# Patient Record
Sex: FEMALE | Race: OTHER | HISPANIC OR LATINO
[De-identification: names, ages, dates, MRNs, and addresses within clinical notes are randomized per-mention and may not be internally consistent; named-entity substitution may affect disease eponyms.]

---

## 2019-05-07 ENCOUNTER — ASOB RESULT (OUTPATIENT)
Age: 29
End: 2019-05-07

## 2019-05-07 ENCOUNTER — APPOINTMENT (OUTPATIENT)
Dept: ANTEPARTUM | Facility: CLINIC | Age: 29
End: 2019-05-07
Payer: COMMERCIAL

## 2019-05-07 PROCEDURE — 76801 OB US < 14 WKS SINGLE FETUS: CPT

## 2019-05-07 PROCEDURE — 36416 COLLJ CAPILLARY BLOOD SPEC: CPT

## 2019-05-07 PROCEDURE — 76813 OB US NUCHAL MEAS 1 GEST: CPT

## 2019-07-05 ENCOUNTER — ASOB RESULT (OUTPATIENT)
Age: 29
End: 2019-07-05

## 2019-07-05 ENCOUNTER — APPOINTMENT (OUTPATIENT)
Dept: ANTEPARTUM | Facility: CLINIC | Age: 29
End: 2019-07-05
Payer: COMMERCIAL

## 2019-07-05 PROCEDURE — 76811 OB US DETAILED SNGL FETUS: CPT

## 2019-09-14 ENCOUNTER — OUTPATIENT (OUTPATIENT)
Dept: INPATIENT UNIT | Facility: HOSPITAL | Age: 29
LOS: 1 days | Discharge: HOME | End: 2019-09-14
Payer: COMMERCIAL

## 2019-09-14 VITALS — DIASTOLIC BLOOD PRESSURE: 95 MMHG | SYSTOLIC BLOOD PRESSURE: 147 MMHG | HEART RATE: 86 BPM

## 2019-09-14 VITALS
DIASTOLIC BLOOD PRESSURE: 95 MMHG | SYSTOLIC BLOOD PRESSURE: 141 MMHG | TEMPERATURE: 99 F | HEART RATE: 80 BPM | RESPIRATION RATE: 18 BRPM

## 2019-09-14 DIAGNOSIS — O26.899 OTHER SPECIFIED PREGNANCY RELATED CONDITIONS, UNSPECIFIED TRIMESTER: ICD-10-CM

## 2019-09-14 DIAGNOSIS — Z3A.00 WEEKS OF GESTATION OF PREGNANCY NOT SPECIFIED: ICD-10-CM

## 2019-09-14 LAB
ALBUMIN SERPL ELPH-MCNC: 3.6 G/DL — SIGNIFICANT CHANGE UP (ref 3.3–5)
ALP SERPL-CCNC: 164 U/L — HIGH (ref 40–120)
ALT FLD-CCNC: 18 U/L — SIGNIFICANT CHANGE UP (ref 4–33)
ANION GAP SERPL CALC-SCNC: 13 MMO/L — SIGNIFICANT CHANGE UP (ref 7–14)
APPEARANCE UR: SIGNIFICANT CHANGE UP
APTT BLD: 25.5 SEC — LOW (ref 27.5–36.3)
AST SERPL-CCNC: 20 U/L — SIGNIFICANT CHANGE UP (ref 4–32)
BACTERIA # UR AUTO: SIGNIFICANT CHANGE UP
BASOPHILS # BLD AUTO: 0.04 K/UL — SIGNIFICANT CHANGE UP (ref 0–0.2)
BASOPHILS NFR BLD AUTO: 0.4 % — SIGNIFICANT CHANGE UP (ref 0–2)
BILIRUB SERPL-MCNC: 0.3 MG/DL — SIGNIFICANT CHANGE UP (ref 0.2–1.2)
BILIRUB UR-MCNC: NEGATIVE — SIGNIFICANT CHANGE UP
BLOOD UR QL VISUAL: NEGATIVE — SIGNIFICANT CHANGE UP
BUN SERPL-MCNC: 5 MG/DL — LOW (ref 7–23)
CALCIUM SERPL-MCNC: 9.3 MG/DL — SIGNIFICANT CHANGE UP (ref 8.4–10.5)
CHLORIDE SERPL-SCNC: 104 MMOL/L — SIGNIFICANT CHANGE UP (ref 98–107)
CO2 SERPL-SCNC: 21 MMOL/L — LOW (ref 22–31)
COLOR SPEC: YELLOW — SIGNIFICANT CHANGE UP
CREAT ?TM UR-MCNC: 99.4 MG/DL — SIGNIFICANT CHANGE UP
CREAT SERPL-MCNC: 0.46 MG/DL — LOW (ref 0.5–1.3)
EOSINOPHIL # BLD AUTO: 0.15 K/UL — SIGNIFICANT CHANGE UP (ref 0–0.5)
EOSINOPHIL NFR BLD AUTO: 1.4 % — SIGNIFICANT CHANGE UP (ref 0–6)
FIBRINOGEN PPP-MCNC: 835.8 MG/DL — HIGH (ref 350–510)
GLUCOSE SERPL-MCNC: 123 MG/DL — HIGH (ref 70–99)
GLUCOSE UR-MCNC: NEGATIVE — SIGNIFICANT CHANGE UP
HCT VFR BLD CALC: 29 % — LOW (ref 34.5–45)
HGB BLD-MCNC: 9.2 G/DL — LOW (ref 11.5–15.5)
HYALINE CASTS # UR AUTO: NEGATIVE — SIGNIFICANT CHANGE UP
IMM GRANULOCYTES NFR BLD AUTO: 0.8 % — SIGNIFICANT CHANGE UP (ref 0–1.5)
INR BLD: 1.02 — SIGNIFICANT CHANGE UP (ref 0.88–1.17)
KETONES UR-MCNC: NEGATIVE — SIGNIFICANT CHANGE UP
LDH SERPL L TO P-CCNC: 189 U/L — SIGNIFICANT CHANGE UP (ref 135–225)
LEUKOCYTE ESTERASE UR-ACNC: SIGNIFICANT CHANGE UP
LYMPHOCYTES # BLD AUTO: 1.14 K/UL — SIGNIFICANT CHANGE UP (ref 1–3.3)
LYMPHOCYTES # BLD AUTO: 10.9 % — LOW (ref 13–44)
MCHC RBC-ENTMCNC: 25.9 PG — LOW (ref 27–34)
MCHC RBC-ENTMCNC: 31.7 % — LOW (ref 32–36)
MCV RBC AUTO: 81.7 FL — SIGNIFICANT CHANGE UP (ref 80–100)
MONOCYTES # BLD AUTO: 0.5 K/UL — SIGNIFICANT CHANGE UP (ref 0–0.9)
MONOCYTES NFR BLD AUTO: 4.8 % — SIGNIFICANT CHANGE UP (ref 2–14)
NEUTROPHILS # BLD AUTO: 8.59 K/UL — HIGH (ref 1.8–7.4)
NEUTROPHILS NFR BLD AUTO: 81.7 % — HIGH (ref 43–77)
NITRITE UR-MCNC: NEGATIVE — SIGNIFICANT CHANGE UP
NRBC # FLD: 0 K/UL — SIGNIFICANT CHANGE UP (ref 0–0)
PH UR: 7.5 — SIGNIFICANT CHANGE UP (ref 5–8)
PLATELET # BLD AUTO: 389 K/UL — SIGNIFICANT CHANGE UP (ref 150–400)
PMV BLD: 9.1 FL — SIGNIFICANT CHANGE UP (ref 7–13)
POTASSIUM SERPL-MCNC: 3.2 MMOL/L — LOW (ref 3.5–5.3)
POTASSIUM SERPL-SCNC: 3.2 MMOL/L — LOW (ref 3.5–5.3)
PROT SERPL-MCNC: 6.9 G/DL — SIGNIFICANT CHANGE UP (ref 6–8.3)
PROT UR-MCNC: 10 — SIGNIFICANT CHANGE UP
PROT UR-MCNC: 23.5 MG/DL — SIGNIFICANT CHANGE UP
PROTHROM AB SERPL-ACNC: 11.3 SEC — SIGNIFICANT CHANGE UP (ref 9.8–13.1)
RBC # BLD: 3.55 M/UL — LOW (ref 3.8–5.2)
RBC # FLD: 14.4 % — SIGNIFICANT CHANGE UP (ref 10.3–14.5)
RBC CASTS # UR COMP ASSIST: SIGNIFICANT CHANGE UP (ref 0–?)
SODIUM SERPL-SCNC: 138 MMOL/L — SIGNIFICANT CHANGE UP (ref 135–145)
SP GR SPEC: 1.01 — SIGNIFICANT CHANGE UP (ref 1–1.04)
SQUAMOUS # UR AUTO: SIGNIFICANT CHANGE UP
URATE SERPL-MCNC: 3.6 MG/DL — SIGNIFICANT CHANGE UP (ref 2.5–7)
UROBILINOGEN FLD QL: SIGNIFICANT CHANGE UP
WBC # BLD: 10.5 K/UL — SIGNIFICANT CHANGE UP (ref 3.8–10.5)
WBC # FLD AUTO: 10.5 K/UL — SIGNIFICANT CHANGE UP (ref 3.8–10.5)
WBC UR QL: HIGH (ref 0–?)
YEAST BUDDING # UR COMP ASSIST: SIGNIFICANT CHANGE UP

## 2019-09-14 PROCEDURE — 59025 FETAL NON-STRESS TEST: CPT | Mod: 26

## 2019-09-14 RX ORDER — SODIUM CHLORIDE 9 MG/ML
3 INJECTION INTRAMUSCULAR; INTRAVENOUS; SUBCUTANEOUS EVERY 8 HOURS
Refills: 0 | Status: DISCONTINUED | OUTPATIENT
Start: 2019-09-14 | End: 2019-10-05

## 2019-09-14 RX ORDER — LABETALOL HCL 100 MG
1 TABLET ORAL
Qty: 60 | Refills: 0
Start: 2019-09-14 | End: 2019-10-13

## 2019-09-14 NOTE — OB PROVIDER TRIAGE NOTE - NSHPPHYSICALEXAM_GEN_ALL_CORE
abdomen soft, nontender  taus: sliup, breech presentation, posterior placenta, bpp 8/8, justin 13.9, efw 1703 grams  nst in progress abdomen soft, nontender  taus: sliup, tahira breech presentation, posterior placenta, bpp 8/8, justin 13.9, efw 1703 grams  nst in progress

## 2019-09-14 NOTE — OB PROVIDER TRIAGE NOTE - NSOBPROVIDERNOTE_OBGYN_ALL_OB_FT
28 y.o.  BECCA 19 @ 30.3 weeks presents from scheduled prenatal appointment for evaluation of elevated BP in office of 150/98, and 160/98. Pt denies epigastric pain, RUQ pain, visual disturbance, HA. Pt states +FM and uncomplicated antepartum course.     allergies:  NKDA  medications:  prenatal vitamins  progesta    med/surg hx:  denies  OBGYN hx:  sab with d+c x 1  fibroid uterus x 2: 5cm and 6cm    abdomen soft, nontender  taus: sliup, breech presentation, posterior placenta, bpp 8/8, justin 13.9, efw 1703 grams  nst in progress    HELLP labs pending    discussed with Dr Horton, Dr Scott and Dr Caceres 28 y.o.  BECCA 19 @ 30.3 weeks presents from scheduled prenatal appointment for evaluation of elevated BP in office of 150/98, and 160/98. Pt denies epigastric pain, RUQ pain, visual disturbance, HA. Pt states +FM and uncomplicated antepartum course.     allergies:  NKDA  medications:  prenatal vitamins  progesta    med/surg hx:  denies  OBGYN hx:  sab with d+c x 1  fibroid uterus x 2: 5cm and 6cm    abdomen soft, nontender  taus: sliup, breech presentation, posterior placenta, bpp 8/8, justin 13.9, efw 1703 grams  nst in progress    HELLP labs pending    discussed with Dr Horton, Dr Scott and Dr Caceres    1339    HELLP labs reviewed:    10.5 > 9.2/29 <389    fibrinogen    serum creatinine 0.4  ast/alt   uric acid 3.6      UA  protein 10  negative nitrites  large leukocyte esterase  0-2 rbc  11-25 wbc    ucx pending    pcr 0.2    BPs    139/83, 138/89, 137/87, 137/93, 137/90 28 y.o.  BECCA 19 @ 30.3 weeks presents from scheduled prenatal appointment for evaluation of elevated BP in office of 150/98, and 160/98. Pt denies epigastric pain, RUQ pain, visual disturbance, HA. Pt states +FM. Antepartum course complicated by gHTN. Pt completed 24 hour urine collection 19, results unknown.    allergies:  NKDA  medications:  prenatal vitamins  progesta    med/surg hx:  denies  OBGYN hx:  sab with d+c x 1  fibroid uterus x 2: 5cm and 6cm    abdomen soft, nontender  taus: sliup, breech presentation, posterior placenta, bpp 8/8, justin 13.9, efw 1703 grams  nst in progress    HELLP labs pending    discussed with Dr Horton, Dr Scott and Dr Caceres    1339    HELLP labs reviewed:    10.5 > 9.2/29 <389    fibrinogen 835    serum creatinine 0.4  ast/alt   uric acid 3.6      UA  protein 10  negative nitrites  large leukocyte esterase  0-2 rbc  11-25 wbc    ucx pending    pcr 0.2    BPs    139/83, 138/89, 137/87, 137/93, 137/90    no evidence of pre-eclampsia at this time     labor precautions/fetal kick counts, signs and symptoms of pre-eclampsia reviewed. Encouraged increased PO hydration. F/U next scheduled prenatal appointment. 28 y.o.  BECCA 19 @ 30.3 weeks presents from scheduled prenatal appointment for evaluation of elevated BP in office of 150/98, and 160/98. Pt denies epigastric pain, RUQ pain, visual disturbance, HA. Pt states +FM. Antepartum course complicated by gHTN. Pt completed 24 hour urine collection 19, results unknown.    allergies:  NKDA  medications:  prenatal vitamins  progesta    med/surg hx:  denies  OBGYN hx:  sab with d+c x 1  fibroid uterus x 2: 5cm and 6cm    abdomen soft, nontender  taus: sliup, breech presentation, posterior placenta, posterior fibroid noted 5x6 cm, bpp 8/8, justin 13.9, efw 1703 grams  nst in progress    HELLP labs pending    discussed with Dr Horton, Dr Scott and Dr Caceres    1339    HELLP labs reviewed:    10.5 > 9.2/29 <389    fibrinogen 835    serum creatinine 0.4  ast/alt   uric acid 3.6      UA  protein 10  negative nitrites  large leukocyte esterase  0-2 rbc  11-25 wbc    ucx pending    pcr 0.2    BPs    139/83, 138/89, 137/87, 137/93, 137/90    no evidence of pre-eclampsia at this time    discussed with Dr Agustin. Pt for discharge home with rx for labetalol 100 mg PO BID and prenatal appointment on 19.     labor precautions/fetal kick counts, signs and symptoms of pre-eclampsia reviewed. Encouraged increased PO hydration.     Clara, GRIFFIN

## 2019-09-14 NOTE — OB RN TRIAGE NOTE - CHIEF COMPLAINT QUOTE
"My dr told me to come to the hospital because I had high blood pressure in the office."  150/98, 160/98.  24 urine protein collection done and WNL.

## 2019-09-14 NOTE — OB RN TRIAGE NOTE - CURRENT PREGNANCY COMPLICATIONS, OB PROFILE
failed 1st glucose screen and was unable to tolerate 3 gr screen.  pt told to monitor FS./Hypertensive Disorder

## 2019-09-14 NOTE — OB PROVIDER TRIAGE NOTE - ADDITIONAL INSTRUCTIONS
Please take your medication as prescribed. Please make an appointment to be seen in your OB office for Monday 9/16/19. Please drink 1-2 liters of fluid per day.

## 2019-09-14 NOTE — OB PROVIDER TRIAGE NOTE - HISTORY OF PRESENT ILLNESS
28 y.o.  BECCA 19 @ 30.3 weeks presents from scheduled prenatal appointment for evaluation of elevated BP in office of 150/98, and 160/98. Pt denies epigastric pain, RUQ pain, visual disturbance, HA. Pt states +FM and uncomplicated antepartum course.

## 2019-09-15 LAB
BACTERIA UR CULT: SIGNIFICANT CHANGE UP
SPECIMEN SOURCE: SIGNIFICANT CHANGE UP

## 2019-09-23 ENCOUNTER — OUTPATIENT (OUTPATIENT)
Dept: OUTPATIENT SERVICES | Facility: HOSPITAL | Age: 29
LOS: 1 days | End: 2019-09-23

## 2019-09-23 ENCOUNTER — APPOINTMENT (OUTPATIENT)
Dept: ANTEPARTUM | Facility: CLINIC | Age: 29
End: 2019-09-23

## 2019-09-23 ENCOUNTER — APPOINTMENT (OUTPATIENT)
Dept: ANTEPARTUM | Facility: CLINIC | Age: 29
End: 2019-09-23
Payer: COMMERCIAL

## 2019-09-23 ENCOUNTER — ASOB RESULT (OUTPATIENT)
Age: 29
End: 2019-09-23

## 2019-09-23 PROCEDURE — 76805 OB US >/= 14 WKS SNGL FETUS: CPT

## 2019-09-23 PROCEDURE — 99244 OFF/OP CNSLTJ NEW/EST MOD 40: CPT | Mod: 25

## 2019-09-23 PROCEDURE — 76818 FETAL BIOPHYS PROFILE W/NST: CPT | Mod: 26

## 2019-09-23 PROCEDURE — 76820 UMBILICAL ARTERY ECHO: CPT

## 2019-09-24 PROBLEM — O02.1 MISSED ABORTION: Chronic | Status: ACTIVE | Noted: 2019-09-14

## 2019-09-24 PROBLEM — D21.9 BENIGN NEOPLASM OF CONNECTIVE AND OTHER SOFT TISSUE, UNSPECIFIED: Chronic | Status: ACTIVE | Noted: 2019-09-14

## 2019-09-25 DIAGNOSIS — O34.13 MATERNAL CARE FOR BENIGN TUMOR OF CORPUS UTERI, THIRD TRIMESTER: ICD-10-CM

## 2019-09-25 DIAGNOSIS — O41.93X0 DISORDER OF AMNIOTIC FLUID AND MEMBRANES, UNSPECIFIED, THIRD TRIMESTER, NOT APPLICABLE OR UNSPECIFIED: ICD-10-CM

## 2019-09-25 DIAGNOSIS — Z3A.31 31 WEEKS GESTATION OF PREGNANCY: ICD-10-CM

## 2019-09-25 DIAGNOSIS — O34.90 MATERNAL CARE FOR ABNORMALITY OF PELVIC ORGAN, UNSPECIFIED, UNSPECIFIED TRIMESTER: ICD-10-CM

## 2019-09-25 DIAGNOSIS — O99.013 ANEMIA COMPLICATING PREGNANCY, THIRD TRIMESTER: ICD-10-CM

## 2019-09-25 DIAGNOSIS — O13.3 GESTATIONAL [PREGNANCY-INDUCED] HYPERTENSION WITHOUT SIGNIFICANT PROTEINURIA, THIRD TRIMESTER: ICD-10-CM

## 2019-09-30 ENCOUNTER — APPOINTMENT (OUTPATIENT)
Dept: ANTEPARTUM | Facility: CLINIC | Age: 29
End: 2019-09-30
Payer: COMMERCIAL

## 2019-09-30 ENCOUNTER — OUTPATIENT (OUTPATIENT)
Dept: OUTPATIENT SERVICES | Facility: HOSPITAL | Age: 29
LOS: 1 days | End: 2019-09-30

## 2019-09-30 ENCOUNTER — INPATIENT (INPATIENT)
Facility: HOSPITAL | Age: 29
LOS: 12 days | Discharge: HOME | End: 2019-10-13
Attending: OBSTETRICS & GYNECOLOGY | Admitting: OBSTETRICS & GYNECOLOGY
Payer: COMMERCIAL

## 2019-09-30 ENCOUNTER — ASOB RESULT (OUTPATIENT)
Age: 29
End: 2019-09-30

## 2019-09-30 VITALS — TEMPERATURE: 99 F

## 2019-09-30 DIAGNOSIS — O14.90 UNSPECIFIED PRE-ECLAMPSIA, UNSPECIFIED TRIMESTER: ICD-10-CM

## 2019-09-30 DIAGNOSIS — O14.13 SEVERE PRE-ECLAMPSIA, THIRD TRIMESTER: ICD-10-CM

## 2019-09-30 LAB
ALBUMIN SERPL ELPH-MCNC: 3.9 G/DL — SIGNIFICANT CHANGE UP (ref 3.3–5)
ALP SERPL-CCNC: 207 U/L — HIGH (ref 40–120)
ALT FLD-CCNC: 20 U/L — SIGNIFICANT CHANGE UP (ref 4–33)
ANION GAP SERPL CALC-SCNC: 12 MMO/L — SIGNIFICANT CHANGE UP (ref 7–14)
APPEARANCE UR: SIGNIFICANT CHANGE UP
APTT BLD: 27.1 SEC — LOW (ref 27.5–36.3)
AST SERPL-CCNC: 18 U/L — SIGNIFICANT CHANGE UP (ref 4–32)
BASOPHILS # BLD AUTO: 0.07 K/UL — SIGNIFICANT CHANGE UP (ref 0–0.2)
BASOPHILS NFR BLD AUTO: 0.6 % — SIGNIFICANT CHANGE UP (ref 0–2)
BILIRUB SERPL-MCNC: 0.3 MG/DL — SIGNIFICANT CHANGE UP (ref 0.2–1.2)
BILIRUB UR-MCNC: NEGATIVE — SIGNIFICANT CHANGE UP
BLD GP AB SCN SERPL QL: NEGATIVE — SIGNIFICANT CHANGE UP
BLOOD UR QL VISUAL: NEGATIVE — SIGNIFICANT CHANGE UP
BUN SERPL-MCNC: 6 MG/DL — LOW (ref 7–23)
CALCIUM SERPL-MCNC: 9.7 MG/DL — SIGNIFICANT CHANGE UP (ref 8.4–10.5)
CHLORIDE SERPL-SCNC: 102 MMOL/L — SIGNIFICANT CHANGE UP (ref 98–107)
CO2 SERPL-SCNC: 24 MMOL/L — SIGNIFICANT CHANGE UP (ref 22–31)
COLOR SPEC: SIGNIFICANT CHANGE UP
CREAT ?TM UR-MCNC: 61.7 MG/DL — SIGNIFICANT CHANGE UP
CREAT SERPL-MCNC: 0.55 MG/DL — SIGNIFICANT CHANGE UP (ref 0.5–1.3)
EOSINOPHIL # BLD AUTO: 0.18 K/UL — SIGNIFICANT CHANGE UP (ref 0–0.5)
EOSINOPHIL NFR BLD AUTO: 1.5 % — SIGNIFICANT CHANGE UP (ref 0–6)
FIBRINOGEN PPP-MCNC: 986 MG/DL — HIGH (ref 350–510)
GLUCOSE SERPL-MCNC: 79 MG/DL — SIGNIFICANT CHANGE UP (ref 70–99)
GLUCOSE UR-MCNC: NEGATIVE — SIGNIFICANT CHANGE UP
HCT VFR BLD CALC: 31.9 % — LOW (ref 34.5–45)
HGB BLD-MCNC: 9.9 G/DL — LOW (ref 11.5–15.5)
IMM GRANULOCYTES NFR BLD AUTO: 1.1 % — SIGNIFICANT CHANGE UP (ref 0–1.5)
KETONES UR-MCNC: NEGATIVE — SIGNIFICANT CHANGE UP
LEUKOCYTE ESTERASE UR-ACNC: SIGNIFICANT CHANGE UP
LYMPHOCYTES # BLD AUTO: 1.26 K/UL — SIGNIFICANT CHANGE UP (ref 1–3.3)
LYMPHOCYTES # BLD AUTO: 10.3 % — LOW (ref 13–44)
M PROTEIN 24H MFR UR ELPH: 19 MG/24 HR — SIGNIFICANT CHANGE UP
MAGNESIUM SERPL-MCNC: 5.1 MG/DL — HIGH (ref 1.6–2.6)
MCHC RBC-ENTMCNC: 25.8 PG — LOW (ref 27–34)
MCHC RBC-ENTMCNC: 31 % — LOW (ref 32–36)
MCV RBC AUTO: 83.1 FL — SIGNIFICANT CHANGE UP (ref 80–100)
MONOCYTES # BLD AUTO: 0.65 K/UL — SIGNIFICANT CHANGE UP (ref 0–0.9)
MONOCYTES NFR BLD AUTO: 5.3 % — SIGNIFICANT CHANGE UP (ref 2–14)
NEUTROPHILS # BLD AUTO: 9.93 K/UL — HIGH (ref 1.8–7.4)
NEUTROPHILS NFR BLD AUTO: 81.2 % — HIGH (ref 43–77)
NITRITE UR-MCNC: NEGATIVE — SIGNIFICANT CHANGE UP
NRBC # FLD: 0 K/UL — SIGNIFICANT CHANGE UP (ref 0–0)
PH UR: 7 — SIGNIFICANT CHANGE UP (ref 5–8)
PLATELET # BLD AUTO: 355 K/UL — SIGNIFICANT CHANGE UP (ref 150–400)
PMV BLD: 9.6 FL — SIGNIFICANT CHANGE UP (ref 7–13)
POTASSIUM SERPL-MCNC: 3.7 MMOL/L — SIGNIFICANT CHANGE UP (ref 3.5–5.3)
POTASSIUM SERPL-SCNC: 3.7 MMOL/L — SIGNIFICANT CHANGE UP (ref 3.5–5.3)
PROT SERPL-MCNC: 7.6 G/DL — SIGNIFICANT CHANGE UP (ref 6–8.3)
PROT UR-MCNC: 19 MG/DL — SIGNIFICANT CHANGE UP
PROT UR-MCNC: NEGATIVE — SIGNIFICANT CHANGE UP
RBC # BLD: 3.84 M/UL — SIGNIFICANT CHANGE UP (ref 3.8–5.2)
RBC # FLD: 16.2 % — HIGH (ref 10.3–14.5)
RBC CASTS # UR COMP ASSIST: SIGNIFICANT CHANGE UP (ref 0–?)
RH IG SCN BLD-IMP: POSITIVE — SIGNIFICANT CHANGE UP
RH IG SCN BLD-IMP: POSITIVE — SIGNIFICANT CHANGE UP
SODIUM SERPL-SCNC: 138 MMOL/L — SIGNIFICANT CHANGE UP (ref 135–145)
SP GR SPEC: 1.01 — SIGNIFICANT CHANGE UP (ref 1–1.04)
SQUAMOUS # UR AUTO: SIGNIFICANT CHANGE UP
T PALLIDUM AB TITR SER: NEGATIVE — SIGNIFICANT CHANGE UP
URATE SERPL-MCNC: 3.8 MG/DL — SIGNIFICANT CHANGE UP (ref 2.5–7)
UROBILINOGEN FLD QL: NORMAL — SIGNIFICANT CHANGE UP
WBC # BLD: 12.23 K/UL — HIGH (ref 3.8–10.5)
WBC # FLD AUTO: 12.23 K/UL — HIGH (ref 3.8–10.5)
WBC UR QL: HIGH (ref 0–?)

## 2019-09-30 PROCEDURE — 76820 UMBILICAL ARTERY ECHO: CPT

## 2019-09-30 PROCEDURE — 76818 FETAL BIOPHYS PROFILE W/NST: CPT | Mod: 26

## 2019-09-30 RX ORDER — MAGNESIUM SULFATE 500 MG/ML
2 VIAL (ML) INJECTION
Qty: 40 | Refills: 0 | Status: DISCONTINUED | OUTPATIENT
Start: 2019-09-30 | End: 2019-10-01

## 2019-09-30 RX ORDER — MAGNESIUM SULFATE 500 MG/ML
4 VIAL (ML) INJECTION ONCE
Refills: 0 | Status: DISCONTINUED | OUTPATIENT
Start: 2019-09-30 | End: 2019-09-30

## 2019-09-30 RX ORDER — LABETALOL HCL 100 MG
200 TABLET ORAL
Refills: 0 | Status: DISCONTINUED | OUTPATIENT
Start: 2019-09-30 | End: 2019-09-30

## 2019-09-30 RX ORDER — LABETALOL HCL 100 MG
20 TABLET ORAL ONCE
Refills: 0 | Status: COMPLETED | OUTPATIENT
Start: 2019-09-30 | End: 2019-09-30

## 2019-09-30 RX ORDER — MAGNESIUM SULFATE 500 MG/ML
4 VIAL (ML) INJECTION ONCE
Refills: 0 | Status: COMPLETED | OUTPATIENT
Start: 2019-09-30 | End: 2019-09-30

## 2019-09-30 RX ADMIN — Medication 12 MILLIGRAM(S): at 14:37

## 2019-09-30 RX ADMIN — Medication 50 GM/HR: at 14:43

## 2019-09-30 RX ADMIN — Medication 20 MILLIGRAM(S): at 15:41

## 2019-09-30 RX ADMIN — Medication 300 GRAM(S): at 14:28

## 2019-09-30 RX ADMIN — Medication 50 GM/HR: at 20:38

## 2019-09-30 NOTE — OB PROVIDER H&P - TERM DELIVERIES, OB PROFILE
0 Pt encountered in semisupine position, no distress, AxOx4, with +IV, left LE wrapped in ace bandage dry/intact.

## 2019-09-30 NOTE — H&P ADULT - HISTORY OF PRESENT ILLNESS
28 y.o.  BECCA 19 @ 32.5 weeks presents from ATU with elevated /100s on Labetalol. She has been seen in triage before for non-peristent BP in office of 150/98, and 160/98. She also complains of visual disturbance including lights in her vision for 1 week. Pt denies epigastric pain, RUQ pain, HA. Pt states +FM. Pt completed 24 hour urine collection 19, >300 protein/day    Allergies:  NKDA  Medications:  prenatal vitamins  progesta    PMSHx: D&C, anemia  OBGYN hx: SAB with d+c x 1  fibroid uterus x 2: 5cm and 6cm

## 2019-09-30 NOTE — H&P ADULT - PROBLEM SELECTOR PLAN 1
-admit to antepartum  -reg diet  -BMZ  -Mag  -Labetalol 200 BID, add Procardia if pressure continues to be elevated  -GBS culture  -PI labs  -P/C ratio  -plan for delivery at 34 weeks    D/w Dr. Kuo and Dr. Su Ponce PGY-2

## 2019-09-30 NOTE — H&P ADULT - ASSESSMENT
29 yo  at 32,5 here for elevated BP, now with sPEC by proteinuria, visual disturbances, and elevated BP while on medication. Will admit to antepartum service for BP monitoring, magnesium, BMZ, and BP treatment. NST today in ATU wnl

## 2019-09-30 NOTE — CHART NOTE - NSCHARTNOTEFT_GEN_A_CORE
PACU PA 1539     Called by RN due severe range BP x 2- 170/90'2. 169/ 100  Pt sent from ATU w/ blurred vision, intermittently & RUQ pain, on po Labetolol 100mg  @ home .  Pt states blurred vision resolved since admission.  Discussed w/ MD Gil PGY3  Labetolol 20mg IVP x1 stat  Procardia 30mg po daily  Anitra Shirley PAC

## 2019-09-30 NOTE — H&P ADULT - NSICDXPASTMEDICALHX_GEN_ALL_CORE_FT
PAST MEDICAL HISTORY:  Fibroids     Kidney stone     Missed  with d&c    No pertinent past medical history

## 2019-09-30 NOTE — H&P ADULT - NSHPPHYSICALEXAM_GEN_ALL_CORE
Physical Exam:   General: sitting comfortably in bed, NAD   CV: RR S1S2  Lungs: CTA b/l, good air flow b/l   Back: No CVA tenderness  Abd: gravid, no RUQ tenderness, NT, ND, normal bowel sounds  Vaginal: no vaginal bleeding noted on pad   Ext: NT b/l, no edema

## 2019-09-30 NOTE — OB PROVIDER H&P - ASSESSMENT
29 yo  at 32,5 here for elevated BP, now with sPEC by proteinuria, visual disturbances, and elevated BP while on medication. Will admit to antepartum service for BP monitoring, magnesium, BMZ, and BP treatment. NST today in ATU wnl.

## 2019-09-30 NOTE — OB PROVIDER H&P - NSHPPHYSICALEXAM_GEN_ALL_CORE
Physical Exam: Physical Exam:   	General: sitting comfortably in bed, NAD   	CV: RR S1S2  	Lungs: CTA b/l, good air flow b/l   	Back: No CVA tenderness  	Abd: gravid, no RUQ tenderness, NT, ND, normal bowel sounds  	Vaginal: no vaginal bleeding noted on pad   Ext: NT b/l, no edema

## 2019-09-30 NOTE — OB PROVIDER H&P - PROBLEM SELECTOR PLAN 1
Plan: -admit to antepartum  -reg diet  -BMZ  -Mag  -Labetalol 200 BID, add Procardia if pressure continues to be elevated  -GBS culture  -PIH labs  -P/C ratio  -plan for delivery at 34 weeks    D/w Dr. Kuo and Dr. Su Ponce PGY-2. Plan: -admit to antepartum  -reg diet  -BMZ for fetal lung maturity  -Magnesium for seizure prophylaxis  -Labetalol 200 TID, add Procardia if pressure continues to be elevated  -GBS culture as GBS unknown  -PIH labs  -P/C ratio  -NST/BPP per ATU recs  -plan for delivery at 34 weeks    D/w Dr. Kuo and Dr. Su Ponce PGY-2.

## 2019-10-01 LAB
BASOPHILS # BLD AUTO: 0.01 K/UL — SIGNIFICANT CHANGE UP (ref 0–0.2)
BASOPHILS NFR BLD AUTO: 0.1 % — SIGNIFICANT CHANGE UP (ref 0–2)
EOSINOPHIL # BLD AUTO: 0.01 K/UL — SIGNIFICANT CHANGE UP (ref 0–0.5)
EOSINOPHIL NFR BLD AUTO: 0.1 % — SIGNIFICANT CHANGE UP (ref 0–6)
FIBRINOGEN PPP-MCNC: 854.2 MG/DL — HIGH (ref 350–510)
HCT VFR BLD CALC: 29.7 % — LOW (ref 34.5–45)
HGB BLD-MCNC: 9.3 G/DL — LOW (ref 11.5–15.5)
IMM GRANULOCYTES NFR BLD AUTO: 0.9 % — SIGNIFICANT CHANGE UP (ref 0–1.5)
INR BLD: 0.92 — SIGNIFICANT CHANGE UP (ref 0.88–1.17)
LYMPHOCYTES # BLD AUTO: 0.75 K/UL — LOW (ref 1–3.3)
LYMPHOCYTES # BLD AUTO: 5.5 % — LOW (ref 13–44)
MAGNESIUM SERPL-MCNC: 5.5 MG/DL — HIGH (ref 1.6–2.6)
MAGNESIUM SERPL-MCNC: 5.8 MG/DL — HIGH (ref 1.6–2.6)
MCHC RBC-ENTMCNC: 26.1 PG — LOW (ref 27–34)
MCHC RBC-ENTMCNC: 31.3 % — LOW (ref 32–36)
MCV RBC AUTO: 83.4 FL — SIGNIFICANT CHANGE UP (ref 80–100)
MONOCYTES # BLD AUTO: 0.52 K/UL — SIGNIFICANT CHANGE UP (ref 0–0.9)
MONOCYTES NFR BLD AUTO: 3.8 % — SIGNIFICANT CHANGE UP (ref 2–14)
NEUTROPHILS # BLD AUTO: 12.24 K/UL — HIGH (ref 1.8–7.4)
NEUTROPHILS NFR BLD AUTO: 89.6 % — HIGH (ref 43–77)
NRBC # FLD: 0 K/UL — SIGNIFICANT CHANGE UP (ref 0–0)
PLATELET # BLD AUTO: 347 K/UL — SIGNIFICANT CHANGE UP (ref 150–400)
PMV BLD: 9.6 FL — SIGNIFICANT CHANGE UP (ref 7–13)
PROTHROM AB SERPL-ACNC: 10.2 SEC — SIGNIFICANT CHANGE UP (ref 9.8–13.1)
RBC # BLD: 3.56 M/UL — LOW (ref 3.8–5.2)
RBC # FLD: 16.3 % — HIGH (ref 10.3–14.5)
SPECIMEN SOURCE: SIGNIFICANT CHANGE UP
WBC # BLD: 13.65 K/UL — HIGH (ref 3.8–10.5)
WBC # FLD AUTO: 13.65 K/UL — HIGH (ref 3.8–10.5)

## 2019-10-01 RX ORDER — ACETAMINOPHEN 500 MG
975 TABLET ORAL ONCE
Refills: 0 | Status: COMPLETED | OUTPATIENT
Start: 2019-10-01 | End: 2019-10-01

## 2019-10-01 RX ADMIN — Medication 12 MILLIGRAM(S): at 14:31

## 2019-10-01 RX ADMIN — Medication 975 MILLIGRAM(S): at 03:30

## 2019-10-01 RX ADMIN — Medication 975 MILLIGRAM(S): at 02:32

## 2019-10-01 NOTE — PROGRESS NOTE ADULT - SUBJECTIVE AND OBJECTIVE BOX
Patient seen at bedside. No complaints currently. Reports mild headache overnight, resolved with tylenol. Denies abdominal pain/contractions, LOF, vaginal bleeding. Reports active FM.     /75 P95 R17 T98.4  Gen: NAD  Abd: soft, nontender, gravid  Pelvic: deferred    A/P: 27yo P0 @ 32+6 weeks admitted with preeclampsia with severe features.   -BPs reviewed - now well controlled  -continue labetalol 200mg TID, procarida 30mg XL  -2nd dose of betamethasone for fetal lung maturity today  -continue magnesium sulfate for seizure ppx  -patient BREECH on last sonogram, will need to be scheduled for PCS at 34 weeks

## 2019-10-01 NOTE — PROGRESS NOTE ADULT - SUBJECTIVE AND OBJECTIVE BOX
R3 Note  HD#2    INTERVAL HPI/OVERNIGHT EVENTS: Pt seen and examined at bedside.  Pt notes feeling lightheaded, though it resolved fairly quickly. Pt denies headache, blurry vision, spots in her vision.  Ambulating, passing flatus, tolerating regular diet, urinating spontaneously  She denies nausea/vomiting/fever/chills/chest pain/SOB/dizziness.    MEDICATIONS  (STANDING):  Bonjesta 1 Tablet(s) 1 Tablet(s) Oral two times a day  docusate sodium 100 milliGRAM(s) Oral three times a day  ferrous    sulfate 325 milliGRAM(s) Oral two times a day  influenza   Vaccine 0.5 milliLiter(s) IntraMuscular once  labetalol 200 milliGRAM(s) Oral three times a day  NIFEdipine XL 30 milliGRAM(s) Oral daily  prenatal multivitamin 1 Tablet(s) Oral daily    MEDICATIONS  (PRN):      12 point ROS negative except as outlined above    Vital Signs Last 24 Hrs  T(C): 37.1 (01 Oct 2019 14:08), Max: 37.1 (01 Oct 2019 14:08)  T(F): 98.7 (01 Oct 2019 14:08), Max: 98.7 (01 Oct 2019 14:08)  HR: 91 (01 Oct 2019 17:48) (81 - 114)  BP: 127/78 (01 Oct 2019 17:48) (104/47 - 157/97)  BP(mean): --  RR: 18 (01 Oct 2019 17:48) (17 - 18)  SpO2: 99% (01 Oct 2019 17:48) (88% - 100%)    I&O's Summary    30 Sep 2019 07:01  -  01 Oct 2019 07:00  --------------------------------------------------------  IN: 0 mL / OUT: 800 mL / NET: -800 mL          PHYSICAL EXAM:    GA: NAD, A+0 x 3  CV: RRR  Pulm: CTA BL  Abd: soft, nontender, nondistended, no rebound or guarding,   Extremities: no swelling or calf tenderness    EFM: 140, mod rox, + 15x15 accels, - decels    Lines:      LABS:                          9.3    13.65 )-----------( 347      ( 01 Oct 2019 06:37 )             29.7   baso 0.1    eos 0.1    imm gran 0.9    lymph 5.5    mono 3.8    poly 89.6                         9.9    12.23 )-----------( 355      ( 30 Sep 2019 13:40 )             31.9   baso 0.6    eos 1.5    imm gran 1.1    lymph 10.3   mono 5.3    poly 81.2         PT/INR - ( 01 Oct 2019 06:37 )   PT: 10.2 SEC;   INR: 0.92          PTT - ( 30 Sep 2019 13:40 )  PTT:27.1 SEC  Urinalysis Basic - ( 30 Sep 2019 13:45 )    Color: LIGHT YELLOW / Appearance: Lt TURBID / S.011 / pH: 7.0  Gluc: NEGATIVE / Ketone: NEGATIVE  / Bili: NEGATIVE / Urobili: NORMAL   Blood: NEGATIVE / Protein: NEGATIVE / Nitrite: NEGATIVE   Leuk Esterase: MODERATE / RBC: 0-2 / WBC 6-10   Sq Epi: MODRATE / Non Sq Epi: x / Bacteria: x            RADIOLOGY & ADDITIONAL TESTS:

## 2019-10-02 LAB
ALBUMIN SERPL ELPH-MCNC: 3.3 G/DL — SIGNIFICANT CHANGE UP (ref 3.3–5)
ALP SERPL-CCNC: 179 U/L — HIGH (ref 40–120)
ALT FLD-CCNC: 26 U/L — SIGNIFICANT CHANGE UP (ref 4–33)
ANION GAP SERPL CALC-SCNC: 11 MMO/L — SIGNIFICANT CHANGE UP (ref 7–14)
ANISOCYTOSIS BLD QL: SLIGHT — SIGNIFICANT CHANGE UP
APTT BLD: 23.9 SEC — LOW (ref 27.5–36.3)
AST SERPL-CCNC: 27 U/L — SIGNIFICANT CHANGE UP (ref 4–32)
BASOPHILS # BLD AUTO: 0.03 K/UL — SIGNIFICANT CHANGE UP (ref 0–0.2)
BASOPHILS NFR BLD AUTO: 0.2 % — SIGNIFICANT CHANGE UP (ref 0–2)
BASOPHILS NFR SPEC: 0 % — SIGNIFICANT CHANGE UP (ref 0–2)
BILIRUB SERPL-MCNC: < 0.2 MG/DL — LOW (ref 0.2–1.2)
BUN SERPL-MCNC: 7 MG/DL — SIGNIFICANT CHANGE UP (ref 7–23)
CALCIUM SERPL-MCNC: 8.5 MG/DL — SIGNIFICANT CHANGE UP (ref 8.4–10.5)
CHLORIDE SERPL-SCNC: 108 MMOL/L — HIGH (ref 98–107)
CO2 SERPL-SCNC: 18 MMOL/L — LOW (ref 22–31)
CREAT SERPL-MCNC: 0.4 MG/DL — LOW (ref 0.5–1.3)
EOSINOPHIL # BLD AUTO: 0 K/UL — SIGNIFICANT CHANGE UP (ref 0–0.5)
EOSINOPHIL NFR BLD AUTO: 0 % — SIGNIFICANT CHANGE UP (ref 0–6)
EOSINOPHIL NFR FLD: 0 % — SIGNIFICANT CHANGE UP (ref 0–6)
FIBRINOGEN PPP-MCNC: 680.9 MG/DL — HIGH (ref 350–510)
GLUCOSE SERPL-MCNC: 115 MG/DL — HIGH (ref 70–99)
HCT VFR BLD CALC: 27.4 % — LOW (ref 34.5–45)
HGB BLD-MCNC: 8.4 G/DL — LOW (ref 11.5–15.5)
HYPOCHROMIA BLD QL: SLIGHT — SIGNIFICANT CHANGE UP
IMM GRANULOCYTES NFR BLD AUTO: 2.7 % — HIGH (ref 0–1.5)
INR BLD: 0.97 — SIGNIFICANT CHANGE UP (ref 0.88–1.17)
LDH SERPL L TO P-CCNC: 205 U/L — SIGNIFICANT CHANGE UP (ref 135–225)
LYMPHOCYTES # BLD AUTO: 0.88 K/UL — LOW (ref 1–3.3)
LYMPHOCYTES # BLD AUTO: 6.3 % — LOW (ref 13–44)
LYMPHOCYTES NFR SPEC AUTO: 11 % — LOW (ref 13–44)
MACROCYTES BLD QL: SLIGHT — SIGNIFICANT CHANGE UP
MANUAL SMEAR VERIFICATION: SIGNIFICANT CHANGE UP
MCHC RBC-ENTMCNC: 25.8 PG — LOW (ref 27–34)
MCHC RBC-ENTMCNC: 30.7 % — LOW (ref 32–36)
MCV RBC AUTO: 84.3 FL — SIGNIFICANT CHANGE UP (ref 80–100)
MICROCYTES BLD QL: SLIGHT — SIGNIFICANT CHANGE UP
MONOCYTES # BLD AUTO: 0.54 K/UL — SIGNIFICANT CHANGE UP (ref 0–0.9)
MONOCYTES NFR BLD AUTO: 3.9 % — SIGNIFICANT CHANGE UP (ref 2–14)
MONOCYTES NFR BLD: 1 % — LOW (ref 2–9)
NEUTROPHIL AB SER-ACNC: 87 % — HIGH (ref 43–77)
NEUTROPHILS # BLD AUTO: 12.16 K/UL — HIGH (ref 1.8–7.4)
NEUTROPHILS NFR BLD AUTO: 86.9 % — HIGH (ref 43–77)
NRBC # BLD: 0 /100WBC — SIGNIFICANT CHANGE UP
NRBC # FLD: 0 K/UL — SIGNIFICANT CHANGE UP (ref 0–0)
OVALOCYTES BLD QL SMEAR: SLIGHT — SIGNIFICANT CHANGE UP
PLATELET # BLD AUTO: 340 K/UL — SIGNIFICANT CHANGE UP (ref 150–400)
PLATELET COUNT - ESTIMATE: NORMAL — SIGNIFICANT CHANGE UP
PMV BLD: 9.4 FL — SIGNIFICANT CHANGE UP (ref 7–13)
POIKILOCYTOSIS BLD QL AUTO: SLIGHT — SIGNIFICANT CHANGE UP
POLYCHROMASIA BLD QL SMEAR: SLIGHT — SIGNIFICANT CHANGE UP
POTASSIUM SERPL-MCNC: 3.6 MMOL/L — SIGNIFICANT CHANGE UP (ref 3.5–5.3)
POTASSIUM SERPL-SCNC: 3.6 MMOL/L — SIGNIFICANT CHANGE UP (ref 3.5–5.3)
PROT SERPL-MCNC: 6.6 G/DL — SIGNIFICANT CHANGE UP (ref 6–8.3)
PROTHROM AB SERPL-ACNC: 10.8 SEC — SIGNIFICANT CHANGE UP (ref 9.8–13.1)
RBC # BLD: 3.25 M/UL — LOW (ref 3.8–5.2)
RBC # FLD: 16.3 % — HIGH (ref 10.3–14.5)
REVIEW TO FOLLOW: YES — SIGNIFICANT CHANGE UP
SODIUM SERPL-SCNC: 137 MMOL/L — SIGNIFICANT CHANGE UP (ref 135–145)
URATE SERPL-MCNC: 2.7 MG/DL — SIGNIFICANT CHANGE UP (ref 2.5–7)
VARIANT LYMPHS # BLD: 1 % — SIGNIFICANT CHANGE UP
WBC # BLD: 13.99 K/UL — HIGH (ref 3.8–10.5)
WBC # FLD AUTO: 13.99 K/UL — HIGH (ref 3.8–10.5)

## 2019-10-02 RX ORDER — FAMOTIDINE 10 MG/ML
20 INJECTION INTRAVENOUS
Refills: 0 | Status: DISCONTINUED | OUTPATIENT
Start: 2019-10-02 | End: 2019-10-10

## 2019-10-02 RX ORDER — FAMOTIDINE 10 MG/ML
20 INJECTION INTRAVENOUS
Refills: 0 | Status: DISCONTINUED | OUTPATIENT
Start: 2019-10-02 | End: 2019-10-02

## 2019-10-02 RX ADMIN — FAMOTIDINE 20 MILLIGRAM(S): 10 INJECTION INTRAVENOUS at 17:30

## 2019-10-02 NOTE — PROGRESS NOTE ADULT - SUBJECTIVE AND OBJECTIVE BOX
R3 Note  HD#3    INTERVAL HPI/OVERNIGHT EVENTS: Pt seen and examined at bedside.  Pt without complaints.  Ambulating, passing flatus, tolerating regular diet, pain controlled with analgesia, urinating spontaneously  She denies nausea/vomiting/fever/chills/chest pain/SOB/dizziness.    MEDICATIONS  (STANDING):  Bonjesta 1 Tablet(s) 1 Tablet(s) Oral two times a day  docusate sodium 100 milliGRAM(s) Oral three times a day  ferrous    sulfate 325 milliGRAM(s) Oral two times a day  influenza   Vaccine 0.5 milliLiter(s) IntraMuscular once  labetalol 200 milliGRAM(s) Oral three times a day  NIFEdipine XL 30 milliGRAM(s) Oral daily  prenatal multivitamin 1 Tablet(s) Oral daily    MEDICATIONS  (PRN):      12 point ROS negative except as outlined above    Vital Signs Last 24 Hrs  T(C): 36.8 (02 Oct 2019 05:26), Max: 37.1 (01 Oct 2019 14:08)  T(F): 98.3 (02 Oct 2019 05:26), Max: 98.7 (01 Oct 2019 14:08)  HR: 91 (02 Oct 2019 05:26) (86 - 110)  BP: 133/84 (02 Oct 2019 05:26) (118/76 - 157/97)  BP(mean): --  RR: 16 (02 Oct 2019 05:26) (16 - 18)  SpO2: 97% (02 Oct 2019 05:26) (97% - 100%)    I&O's Summary        PHYSICAL EXAM:    GA: NAD, A+0 x 3  CV: RRR  Pulm: CTA BL  Abd: soft, nontender, nondistended, no rebound or guarding,   Extremities: no swelling or calf tenderness    Lines:      LABS:                          8.4    13.99 )-----------( 340      ( 02 Oct 2019 05:45 )             27.4   baso 0.2    eos 0.0    imm gran 2.7    lymph 6.3    mono 3.9    poly 86.9                         9.3    13.65 )-----------( 347      ( 01 Oct 2019 06:37 )             29.7   baso 0.1    eos 0.1    imm gran 0.9    lymph 5.5    mono 3.8    poly 89.6                         9.9    12.23 )-----------( 355      ( 30 Sep 2019 13:40 )             31.9   baso 0.6    eos 1.5    imm gran 1.1    lymph 10.3   mono 5.3    poly 81.2         PT/INR - ( 02 Oct 2019 05:45 )   PT: 10.8 SEC;   INR: 0.97          PTT - ( 02 Oct 2019 05:45 )  PTT:23.9 SEC  Urinalysis Basic - ( 30 Sep 2019 13:45 )    Color: LIGHT YELLOW / Appearance: Lt TURBID / S.011 / pH: 7.0  Gluc: NEGATIVE / Ketone: NEGATIVE  / Bili: NEGATIVE / Urobili: NORMAL   Blood: NEGATIVE / Protein: NEGATIVE / Nitrite: NEGATIVE   Leuk Esterase: MODERATE / RBC: 0-2 / WBC 6-10   Sq Epi: MODRATE / Non Sq Epi: x / Bacteria: x            RADIOLOGY & ADDITIONAL TESTS:

## 2019-10-03 ENCOUNTER — APPOINTMENT (OUTPATIENT)
Dept: ANTEPARTUM | Facility: HOSPITAL | Age: 29
End: 2019-10-03
Payer: COMMERCIAL

## 2019-10-03 ENCOUNTER — ASOB RESULT (OUTPATIENT)
Age: 29
End: 2019-10-03

## 2019-10-03 LAB
ALBUMIN SERPL ELPH-MCNC: 3.3 G/DL — SIGNIFICANT CHANGE UP (ref 3.3–5)
ALP SERPL-CCNC: 166 U/L — HIGH (ref 40–120)
ALT FLD-CCNC: 28 U/L — SIGNIFICANT CHANGE UP (ref 4–33)
ANION GAP SERPL CALC-SCNC: 15 MMO/L — HIGH (ref 7–14)
APTT BLD: 23.8 SEC — LOW (ref 27.5–36.3)
AST SERPL-CCNC: 27 U/L — SIGNIFICANT CHANGE UP (ref 4–32)
BASOPHILS # BLD AUTO: 0.05 K/UL — SIGNIFICANT CHANGE UP (ref 0–0.2)
BASOPHILS NFR BLD AUTO: 0.4 % — SIGNIFICANT CHANGE UP (ref 0–2)
BILIRUB SERPL-MCNC: < 0.2 MG/DL — LOW (ref 0.2–1.2)
BUN SERPL-MCNC: 5 MG/DL — LOW (ref 7–23)
CALCIUM SERPL-MCNC: 8.7 MG/DL — SIGNIFICANT CHANGE UP (ref 8.4–10.5)
CHLORIDE SERPL-SCNC: 105 MMOL/L — SIGNIFICANT CHANGE UP (ref 98–107)
CO2 SERPL-SCNC: 19 MMOL/L — LOW (ref 22–31)
CREAT SERPL-MCNC: 0.43 MG/DL — LOW (ref 0.5–1.3)
EOSINOPHIL # BLD AUTO: 0.08 K/UL — SIGNIFICANT CHANGE UP (ref 0–0.5)
EOSINOPHIL NFR BLD AUTO: 0.7 % — SIGNIFICANT CHANGE UP (ref 0–6)
FIBRINOGEN PPP-MCNC: 671.7 MG/DL — HIGH (ref 350–510)
GLUCOSE SERPL-MCNC: 91 MG/DL — SIGNIFICANT CHANGE UP (ref 70–99)
GP B STREP GENITAL QL CULT: SIGNIFICANT CHANGE UP
HCT VFR BLD CALC: 28.2 % — LOW (ref 34.5–45)
HGB BLD-MCNC: 8.6 G/DL — LOW (ref 11.5–15.5)
IMM GRANULOCYTES NFR BLD AUTO: 5.7 % — HIGH (ref 0–1.5)
INR BLD: 1 — SIGNIFICANT CHANGE UP (ref 0.88–1.17)
LDH SERPL L TO P-CCNC: 167 U/L — SIGNIFICANT CHANGE UP (ref 135–225)
LYMPHOCYTES # BLD AUTO: 1.58 K/UL — SIGNIFICANT CHANGE UP (ref 1–3.3)
LYMPHOCYTES # BLD AUTO: 13.3 % — SIGNIFICANT CHANGE UP (ref 13–44)
MCHC RBC-ENTMCNC: 25.4 PG — LOW (ref 27–34)
MCHC RBC-ENTMCNC: 30.5 % — LOW (ref 32–36)
MCV RBC AUTO: 83.2 FL — SIGNIFICANT CHANGE UP (ref 80–100)
MONOCYTES # BLD AUTO: 0.74 K/UL — SIGNIFICANT CHANGE UP (ref 0–0.9)
MONOCYTES NFR BLD AUTO: 6.2 % — SIGNIFICANT CHANGE UP (ref 2–14)
NEUTROPHILS # BLD AUTO: 8.74 K/UL — HIGH (ref 1.8–7.4)
NEUTROPHILS NFR BLD AUTO: 73.7 % — SIGNIFICANT CHANGE UP (ref 43–77)
NRBC # FLD: 0.03 K/UL — SIGNIFICANT CHANGE UP (ref 0–0)
PLATELET # BLD AUTO: 336 K/UL — SIGNIFICANT CHANGE UP (ref 150–400)
PMV BLD: 9.5 FL — SIGNIFICANT CHANGE UP (ref 7–13)
POTASSIUM SERPL-MCNC: 3.4 MMOL/L — LOW (ref 3.5–5.3)
POTASSIUM SERPL-SCNC: 3.4 MMOL/L — LOW (ref 3.5–5.3)
PROT SERPL-MCNC: 6.3 G/DL — SIGNIFICANT CHANGE UP (ref 6–8.3)
PROTHROM AB SERPL-ACNC: 11.1 SEC — SIGNIFICANT CHANGE UP (ref 9.8–13.1)
RBC # BLD: 3.39 M/UL — LOW (ref 3.8–5.2)
RBC # FLD: 16.3 % — HIGH (ref 10.3–14.5)
SODIUM SERPL-SCNC: 139 MMOL/L — SIGNIFICANT CHANGE UP (ref 135–145)
URATE SERPL-MCNC: 2.8 MG/DL — SIGNIFICANT CHANGE UP (ref 2.5–7)
WBC # BLD: 11.87 K/UL — HIGH (ref 3.8–10.5)
WBC # FLD AUTO: 11.87 K/UL — HIGH (ref 3.8–10.5)

## 2019-10-03 PROCEDURE — 76819 FETAL BIOPHYS PROFIL W/O NST: CPT | Mod: 26

## 2019-10-03 RX ADMIN — FAMOTIDINE 20 MILLIGRAM(S): 10 INJECTION INTRAVENOUS at 18:19

## 2019-10-03 NOTE — PROGRESS NOTE ADULT - SUBJECTIVE AND OBJECTIVE BOX
Patient seen and examined at bedside, no acute overnight events. Pain well controlled. No acute complaints. Pt endorses good fetal movement. Denies CP, SOB, N/V, fevers, chills, or any other concerns.    Vital Signs Last 24 Hours  T(C): 36.8 (10-03-19 @ 05:07), Max: 37.4 (10-02-19 @ 18:15)  HR: 68 (10-03-19 @ 05:23) (68 - 90)  BP: 152/79 (10-03-19 @ 05:23) (111/68 - 160/82)  RR: 17 (10-03-19 @ 05:07) (16 - 17)  SpO2: 98% (10-03-19 @ 05:22) (86% - 99%)    I&O's Summary    02 Oct 2019 07:01  -  03 Oct 2019 07:00  --------------------------------------------------------  IN: 0 mL / OUT: 700 mL / NET: -700 mL        Physical Exam:  General: NAD  CV: RR  Lungs: breathing comfortably on RA  Abdomen: soft, gravid, non-tender  SVE:   Ext: no pain or swelling    NST:     Labs:             8.6<L>  11.87<H> )-----------( 336      ( 10-03 @ 05:59 )             28.2<L>               8.4<L>  13.99<H> )-----------( 340      ( 10-02 @ 05:45 )             27.4<L>               9.3<L>  13.65<H> )-----------( 347      ( 10-01 @ 06:37 )             29.7<L>               9.9<L>  12.23<H> )-----------( 355      ( 09-30 @ 13:40 )             31.9<L>        MEDICATIONS  (STANDING):  Bonjesta 1 Tablet(s) 1 Tablet(s) Oral two times a day  docusate sodium 100 milliGRAM(s) Oral three times a day  ferrous    sulfate 325 milliGRAM(s) Oral two times a day  heparin  Injectable 5000 Unit(s) SubCutaneous every 12 hours  influenza   Vaccine 0.5 milliLiter(s) IntraMuscular once  labetalol 200 milliGRAM(s) Oral three times a day  NIFEdipine XL 30 milliGRAM(s) Oral daily  prenatal multivitamin 1 Tablet(s) Oral daily    MEDICATIONS  (PRN):  famotidine    Tablet 20 milliGRAM(s) Oral two times a day PRN indigestion Patient seen and examined at bedside. Overnight pt had isolated severe range BP of 160/82 at which time she was asymptomatic. Repeat BP not severe. No acute complaints. Pt endorses good fetal movement. Denies HA, changes in vision, upper abdominal pain, CP, SOB, N/V, fevers, chills, or any other concerns.    Vital Signs Last 24 Hours  T(C): 36.8 (10-03-19 @ 05:07), Max: 37.4 (10-02-19 @ 18:15)  HR: 68 (10-03-19 @ 05:23) (68 - 90)  BP: 152/79 (10-03-19 @ 05:23) (111/68 - 160/82)  RR: 17 (10-03-19 @ 05:07) (16 - 17)  SpO2: 98% (10-03-19 @ 05:22) (86% - 99%)    I&O's Summary    02 Oct 2019 07:01  -  03 Oct 2019 07:00  --------------------------------------------------------  IN: 0 mL / OUT: 700 mL / NET: -700 mL        Physical Exam:  General: NAD  CV: RR  Lungs: breathing comfortably on RA  Abdomen: soft, gravid, non-tender  Ext: no pain or swelling    NST: reactive    Labs:             8.6<L>  11.87<H> )-----------( 336      ( 10-03 @ 05:59 )             28.2<L>               8.4<L>  13.99<H> )-----------( 340      ( 10-02 @ 05:45 )             27.4<L>               9.3<L>  13.65<H> )-----------( 347      ( 10-01 @ 06:37 )             29.7<L>               9.9<L>  12.23<H> )-----------( 355      ( 09-30 @ 13:40 )             31.9<L>        MEDICATIONS  (STANDING):  Bonjesta 1 Tablet(s) 1 Tablet(s) Oral two times a day  docusate sodium 100 milliGRAM(s) Oral three times a day  ferrous    sulfate 325 milliGRAM(s) Oral two times a day  heparin  Injectable 5000 Unit(s) SubCutaneous every 12 hours  influenza   Vaccine 0.5 milliLiter(s) IntraMuscular once  labetalol 200 milliGRAM(s) Oral three times a day  NIFEdipine XL 30 milliGRAM(s) Oral daily  prenatal multivitamin 1 Tablet(s) Oral daily    MEDICATIONS  (PRN):  famotidine    Tablet 20 milliGRAM(s) Oral two times a day PRN indigestion

## 2019-10-03 NOTE — PROVIDER CONTACT NOTE (OTHER) - SITUATION
Patient 0507 /82. Repeat 15 minutes later 152/79.
Patients blood pressures 14:08 157/97 14:22 150/94
magnesium level drawn at 06:30 =5.8

## 2019-10-03 NOTE — PROVIDER CONTACT NOTE (OTHER) - ASSESSMENT
Patient asymptomatic. Denies headache, visual disturbances, n/v, epigastric pain, LUQ pain. Patient asymptomatic. Denies headache, visual disturbances, n/v, epigastric pain, RUQ pain.

## 2019-10-03 NOTE — CHART NOTE - NSCHARTNOTEFT_GEN_A_CORE
The patient is [age] yr old  mother admitted for preeclampsia at 33w1d gestation. She is on labetolol and procardia. Prenatal labs : GBS neg (  ), HIV neg, RPR non-reactive, HBsAg neg, rubella immune. Mother received betamethasone on  and 10/1.   The following were discussed should she deliver at 33 weeks of gestation  The NICU team will be present at her delivery and will immediately assess and care for her infant.  1. The infant may require respiratory support most likely in the form of CPAP; but mechanical ventilation is also possible  2. The infant will be screened for infection and may be started on antibiotics at birth if there is a significant risk.  3. Feeds will not be started if the infant requires respiratory support. Once stable, feeding will be initiated. The importance of human milk as the exclusive nutrition source was discussed.   4. The infant will be at risk for jaundice which can be treated with phototherapy.  5. Cardiovascular status and blood pressure and perfusion will be monitored.  6. The infant may require blood products during their stay.  7. The infant will stay in the NICU from days to a few weeks depending on birth weight and clinical condition.     Ms. Renee had the opportunity to ask questions and may contact the NICU at any time should further questions arise.   Thank you for the opportunity to participate in the care of this patient and please inform us of any changes in her status. The patient is a 28 yr old  mother admitted for preeclampsia now at 33w1d gestation. She is on labetolol and procardia. Prenatal labs : GBS neg (  ), HIV neg, RPR non-reactive, HBsAg neg, rubella immune. Mother received betamethasone on  and 10/1.   The following were discussed should she deliver at 33-34 weeks of gestation  1. The NICU team will be present at her delivery and will immediately assess and care for her infant.  2. The infant may require respiratory support most likely in the form of CPAP; but mechanical ventilation is also possible  3. The infant will be screened for infection and may be started on antibiotics if there is a significant risk.  4. Feeds will not be started if the infant requires respiratory support. Once stable, feeding will be initiated. The importance of human milk as the exclusive nutrition source was discussed.   5. The infant will be at risk for jaundice which can be treated with phototherapy.  6. Cardiovascular status and blood pressure and perfusion will be monitored.  7. Infant born prematurely are at risk for developmental delay. Once discharged, your infant will be followed by a developemental pediatrician  8. The infant will stay in the NICU from days to a few weeks depending on birth weight and clinical condition.     Ms. Renee had the opportunity to ask questions and may contact the NICU at any time should further questions arise.   Thank you for the opportunity to participate in the care of this patient and please inform us of any changes in her status.

## 2019-10-03 NOTE — PROVIDER CONTACT NOTE (OTHER) - ACTION/TREATMENT ORDERED:
continue to hold magneisum sulfate until further notice
Administer AM BP meds. Continue to monitor.
Labetalol 200mg and Procardia 30xl given early as per Dr. Poe

## 2019-10-04 ENCOUNTER — APPOINTMENT (OUTPATIENT)
Dept: ANTEPARTUM | Facility: HOSPITAL | Age: 29
End: 2019-10-04

## 2019-10-04 LAB
ALBUMIN SERPL ELPH-MCNC: 3.3 G/DL — SIGNIFICANT CHANGE UP (ref 3.3–5)
ALP SERPL-CCNC: 176 U/L — HIGH (ref 40–120)
ALT FLD-CCNC: 25 U/L — SIGNIFICANT CHANGE UP (ref 4–33)
ANION GAP SERPL CALC-SCNC: 11 MMO/L — SIGNIFICANT CHANGE UP (ref 7–14)
APTT BLD: 24.4 SEC — LOW (ref 27.5–36.3)
AST SERPL-CCNC: 21 U/L — SIGNIFICANT CHANGE UP (ref 4–32)
BASOPHILS # BLD AUTO: 0.05 K/UL — SIGNIFICANT CHANGE UP (ref 0–0.2)
BASOPHILS NFR BLD AUTO: 0.5 % — SIGNIFICANT CHANGE UP (ref 0–2)
BILIRUB SERPL-MCNC: < 0.2 MG/DL — LOW (ref 0.2–1.2)
BLD GP AB SCN SERPL QL: NEGATIVE — SIGNIFICANT CHANGE UP
BUN SERPL-MCNC: 6 MG/DL — LOW (ref 7–23)
CALCIUM SERPL-MCNC: 8.9 MG/DL — SIGNIFICANT CHANGE UP (ref 8.4–10.5)
CHLORIDE SERPL-SCNC: 106 MMOL/L — SIGNIFICANT CHANGE UP (ref 98–107)
CO2 SERPL-SCNC: 20 MMOL/L — LOW (ref 22–31)
CREAT SERPL-MCNC: 0.43 MG/DL — LOW (ref 0.5–1.3)
EOSINOPHIL # BLD AUTO: 0.23 K/UL — SIGNIFICANT CHANGE UP (ref 0–0.5)
EOSINOPHIL NFR BLD AUTO: 2.1 % — SIGNIFICANT CHANGE UP (ref 0–6)
FIBRINOGEN PPP-MCNC: 759 MG/DL — HIGH (ref 350–510)
GLUCOSE SERPL-MCNC: 87 MG/DL — SIGNIFICANT CHANGE UP (ref 70–99)
HCT VFR BLD CALC: 28.7 % — LOW (ref 34.5–45)
HGB BLD-MCNC: 8.8 G/DL — LOW (ref 11.5–15.5)
IMM GRANULOCYTES NFR BLD AUTO: 4.6 % — HIGH (ref 0–1.5)
INR BLD: 0.98 — SIGNIFICANT CHANGE UP (ref 0.88–1.17)
LDH SERPL L TO P-CCNC: 179 U/L — SIGNIFICANT CHANGE UP (ref 135–225)
LYMPHOCYTES # BLD AUTO: 1.49 K/UL — SIGNIFICANT CHANGE UP (ref 1–3.3)
LYMPHOCYTES # BLD AUTO: 13.4 % — SIGNIFICANT CHANGE UP (ref 13–44)
MCHC RBC-ENTMCNC: 25.4 PG — LOW (ref 27–34)
MCHC RBC-ENTMCNC: 30.7 % — LOW (ref 32–36)
MCV RBC AUTO: 82.9 FL — SIGNIFICANT CHANGE UP (ref 80–100)
MONOCYTES # BLD AUTO: 0.71 K/UL — SIGNIFICANT CHANGE UP (ref 0–0.9)
MONOCYTES NFR BLD AUTO: 6.4 % — SIGNIFICANT CHANGE UP (ref 2–14)
NEUTROPHILS # BLD AUTO: 8.1 K/UL — HIGH (ref 1.8–7.4)
NEUTROPHILS NFR BLD AUTO: 73 % — SIGNIFICANT CHANGE UP (ref 43–77)
NRBC # FLD: 0.05 K/UL — SIGNIFICANT CHANGE UP (ref 0–0)
PLATELET # BLD AUTO: 360 K/UL — SIGNIFICANT CHANGE UP (ref 150–400)
PMV BLD: 9.5 FL — SIGNIFICANT CHANGE UP (ref 7–13)
POTASSIUM SERPL-MCNC: 3.2 MMOL/L — LOW (ref 3.5–5.3)
POTASSIUM SERPL-SCNC: 3.2 MMOL/L — LOW (ref 3.5–5.3)
PROT SERPL-MCNC: 6.5 G/DL — SIGNIFICANT CHANGE UP (ref 6–8.3)
PROTHROM AB SERPL-ACNC: 11.2 SEC — SIGNIFICANT CHANGE UP (ref 9.8–13.1)
RBC # BLD: 3.46 M/UL — LOW (ref 3.8–5.2)
RBC # FLD: 16.1 % — HIGH (ref 10.3–14.5)
RH IG SCN BLD-IMP: POSITIVE — SIGNIFICANT CHANGE UP
SODIUM SERPL-SCNC: 137 MMOL/L — SIGNIFICANT CHANGE UP (ref 135–145)
URATE SERPL-MCNC: 2.9 MG/DL — SIGNIFICANT CHANGE UP (ref 2.5–7)
WBC # BLD: 11.09 K/UL — HIGH (ref 3.8–10.5)
WBC # FLD AUTO: 11.09 K/UL — HIGH (ref 3.8–10.5)

## 2019-10-04 NOTE — PROGRESS NOTE ADULT - SUBJECTIVE AND OBJECTIVE BOX
Patient seen and examined at bedside, no acute overnight events. No acute complaints. Patient endorses good fetal movement. Patient is ambulating and tolerating regular diet. Denies CP, SOB, N/V, fevers, chills, or any other concerns.    Vital Signs Last 24 Hours  T(C): 36.7 (10-04-19 @ 05:12), Max: 36.8 (10-03-19 @ 13:38)  HR: 72 (10-04-19 @ 05:12) (67 - 91)  BP: 136/78 (10-04-19 @ 05:12) (124/69 - 162/80)  RR: 16 (10-04-19 @ 05:12) (16 - 19)  SpO2: 97% (10-04-19 @ 05:12) (96% - 99%)    I&O's Summary      Physical Exam:  General: NAD  CV: RR  Lungs: breathing comfortably on RA  Abdomen: soft, gravid, non-tender  SVE:   Ext: no pain or swelling    EFM: baseline , mod rox, +accels, -decels  [place]: qmin    Labs:             8.8<L>  11.09<H> )-----------( 360      ( 10-04 @ 05:15 )             28.7<L>               8.6<L>  11.87<H> )-----------( 336      ( 10-03 @ 05:59 )             28.2<L>               8.4<L>  13.99<H> )-----------( 340      ( 10-02 @ 05:45 )             27.4<L>               9.3<L>  13.65<H> )-----------( 347      ( 10-01 @ 06:37 )             29.7<L>               9.9<L>  12.23<H> )-----------( 355      ( 09-30 @ 13:40 )             31.9<L>        MEDICATIONS  (STANDING):  Bonjesta 1 Tablet(s) 1 Tablet(s) Oral two times a day  docusate sodium 100 milliGRAM(s) Oral three times a day  ferrous    sulfate 325 milliGRAM(s) Oral two times a day  heparin  Injectable 5000 Unit(s) SubCutaneous every 12 hours  influenza   Vaccine 0.5 milliLiter(s) IntraMuscular once  labetalol 200 milliGRAM(s) Oral three times a day  NIFEdipine XL 30 milliGRAM(s) Oral daily  prenatal multivitamin 1 Tablet(s) Oral daily    MEDICATIONS  (PRN):  famotidine    Tablet 20 milliGRAM(s) Oral two times a day PRN indigestion Patient seen and examined at bedside. Pt had an isolated severe range BP overnight which did not require IVP of medication. No acute complaints. Patient endorses good fetal movement. Patient is ambulating and tolerating regular diet. Denies CP, SOB, N/V, fevers, chills, or any other concerns. Pt denies HA, changes in vision, upper abdominal pain, or any other concerns.    Vital Signs Last 24 Hours  T(C): 36.7 (10-04-19 @ 05:12), Max: 36.8 (10-03-19 @ 13:38)  HR: 72 (10-04-19 @ 05:12) (67 - 91)  BP: 136/78 (10-04-19 @ 05:12) (124/69 - 162/80)  RR: 16 (10-04-19 @ 05:12) (16 - 19)  SpO2: 97% (10-04-19 @ 05:12) (96% - 99%)    I&O's Summary      Physical Exam:  General: NAD  CV: RR  Lungs: breathing comfortably on RA  Abdomen: soft, gravid, non-tender  Ext: no pain or swelling    NST: reactive    Labs:             8.8<L>  11.09<H> )-----------( 360      ( 10-04 @ 05:15 )             28.7<L>               8.6<L>  11.87<H> )-----------( 336      ( 10-03 @ 05:59 )             28.2<L>               8.4<L>  13.99<H> )-----------( 340      ( 10-02 @ 05:45 )             27.4<L>               9.3<L>  13.65<H> )-----------( 347      ( 10-01 @ 06:37 )             29.7<L>               9.9<L>  12.23<H> )-----------( 355      ( 09-30 @ 13:40 )             31.9<L>        MEDICATIONS  (STANDING):  Bonjesta 1 Tablet(s) 1 Tablet(s) Oral two times a day  docusate sodium 100 milliGRAM(s) Oral three times a day  ferrous    sulfate 325 milliGRAM(s) Oral two times a day  heparin  Injectable 5000 Unit(s) SubCutaneous every 12 hours  influenza   Vaccine 0.5 milliLiter(s) IntraMuscular once  labetalol 200 milliGRAM(s) Oral three times a day  NIFEdipine XL 30 milliGRAM(s) Oral daily  prenatal multivitamin 1 Tablet(s) Oral daily    MEDICATIONS  (PRN):  famotidine    Tablet 20 milliGRAM(s) Oral two times a day PRN indigestion

## 2019-10-05 LAB
ALBUMIN SERPL ELPH-MCNC: 3.2 G/DL — LOW (ref 3.3–5)
ALP SERPL-CCNC: 177 U/L — HIGH (ref 40–120)
ALT FLD-CCNC: 21 U/L — SIGNIFICANT CHANGE UP (ref 4–33)
ANION GAP SERPL CALC-SCNC: 13 MMO/L — SIGNIFICANT CHANGE UP (ref 7–14)
APTT BLD: 25.5 SEC — LOW (ref 27.5–36.3)
AST SERPL-CCNC: 17 U/L — SIGNIFICANT CHANGE UP (ref 4–32)
BASOPHILS # BLD AUTO: 0.05 K/UL — SIGNIFICANT CHANGE UP (ref 0–0.2)
BASOPHILS NFR BLD AUTO: 0.5 % — SIGNIFICANT CHANGE UP (ref 0–2)
BILIRUB SERPL-MCNC: 0.2 MG/DL — SIGNIFICANT CHANGE UP (ref 0.2–1.2)
BUN SERPL-MCNC: 6 MG/DL — LOW (ref 7–23)
CALCIUM SERPL-MCNC: 9.1 MG/DL — SIGNIFICANT CHANGE UP (ref 8.4–10.5)
CHLORIDE SERPL-SCNC: 103 MMOL/L — SIGNIFICANT CHANGE UP (ref 98–107)
CO2 SERPL-SCNC: 21 MMOL/L — LOW (ref 22–31)
CREAT SERPL-MCNC: 0.48 MG/DL — LOW (ref 0.5–1.3)
EOSINOPHIL # BLD AUTO: 0.26 K/UL — SIGNIFICANT CHANGE UP (ref 0–0.5)
EOSINOPHIL NFR BLD AUTO: 2.4 % — SIGNIFICANT CHANGE UP (ref 0–6)
FIBRINOGEN PPP-MCNC: 754 MG/DL — HIGH (ref 350–510)
GLUCOSE SERPL-MCNC: 85 MG/DL — SIGNIFICANT CHANGE UP (ref 70–99)
HCT VFR BLD CALC: 30.6 % — LOW (ref 34.5–45)
HGB BLD-MCNC: 9.4 G/DL — LOW (ref 11.5–15.5)
IMM GRANULOCYTES NFR BLD AUTO: 3.9 % — HIGH (ref 0–1.5)
INR BLD: 0.99 — SIGNIFICANT CHANGE UP (ref 0.88–1.17)
LDH SERPL L TO P-CCNC: 183 U/L — SIGNIFICANT CHANGE UP (ref 135–225)
LYMPHOCYTES # BLD AUTO: 1.51 K/UL — SIGNIFICANT CHANGE UP (ref 1–3.3)
LYMPHOCYTES # BLD AUTO: 13.8 % — SIGNIFICANT CHANGE UP (ref 13–44)
MANUAL SMEAR VERIFICATION: SIGNIFICANT CHANGE UP
MCHC RBC-ENTMCNC: 25.7 PG — LOW (ref 27–34)
MCHC RBC-ENTMCNC: 30.7 % — LOW (ref 32–36)
MCV RBC AUTO: 83.6 FL — SIGNIFICANT CHANGE UP (ref 80–100)
MONOCYTES # BLD AUTO: 0.58 K/UL — SIGNIFICANT CHANGE UP (ref 0–0.9)
MONOCYTES NFR BLD AUTO: 5.3 % — SIGNIFICANT CHANGE UP (ref 2–14)
NEUTROPHILS # BLD AUTO: 8.14 K/UL — HIGH (ref 1.8–7.4)
NEUTROPHILS NFR BLD AUTO: 74.1 % — SIGNIFICANT CHANGE UP (ref 43–77)
NRBC # FLD: 0.06 K/UL — SIGNIFICANT CHANGE UP (ref 0–0)
PLATELET # BLD AUTO: 392 K/UL — SIGNIFICANT CHANGE UP (ref 150–400)
PMV BLD: 9.7 FL — SIGNIFICANT CHANGE UP (ref 7–13)
POTASSIUM SERPL-MCNC: 3.3 MMOL/L — LOW (ref 3.5–5.3)
POTASSIUM SERPL-SCNC: 3.3 MMOL/L — LOW (ref 3.5–5.3)
PROT SERPL-MCNC: 6.5 G/DL — SIGNIFICANT CHANGE UP (ref 6–8.3)
PROTHROM AB SERPL-ACNC: 11 SEC — SIGNIFICANT CHANGE UP (ref 9.8–13.1)
RBC # BLD: 3.66 M/UL — LOW (ref 3.8–5.2)
RBC # FLD: 16.4 % — HIGH (ref 10.3–14.5)
SODIUM SERPL-SCNC: 137 MMOL/L — SIGNIFICANT CHANGE UP (ref 135–145)
URATE SERPL-MCNC: 3.4 MG/DL — SIGNIFICANT CHANGE UP (ref 2.5–7)
WBC # BLD: 10.97 K/UL — HIGH (ref 3.8–10.5)
WBC # FLD AUTO: 10.97 K/UL — HIGH (ref 3.8–10.5)

## 2019-10-05 RX ORDER — POTASSIUM CHLORIDE 20 MEQ
20 PACKET (EA) ORAL
Refills: 0 | Status: COMPLETED | OUTPATIENT
Start: 2019-10-05 | End: 2019-10-05

## 2019-10-05 RX ADMIN — Medication 20 MILLIEQUIVALENT(S): at 18:39

## 2019-10-05 RX ADMIN — Medication 20 MILLIEQUIVALENT(S): at 13:52

## 2019-10-05 RX ADMIN — Medication 20 MILLIEQUIVALENT(S): at 16:50

## 2019-10-05 NOTE — PROGRESS NOTE ADULT - SUBJECTIVE AND OBJECTIVE BOX
R3 Antepartum Progress Note - HD#6    Subjective  Patient seen and examined at bedside, no acute overnight events. This AM, patient reports no acute complaints. Patient reports good fetal movement. Denies LOF, VB, CTX. Denies HA, changes in vision, CP, SOB, palpitations, LE edema/tenderness.    Objective  Vital Signs Last 24 Hours  T(C): 36.8 (10-04-19 @ 21:03), Max: 37.1 (10-04-19 @ 09:37)  HR: 72 (10-05-19 @ 01:10) (72 - 93)  BP: 129/77 (10-05-19 @ 01:10) (126/76 - 157/92)  RR: 17 (10-04-19 @ 21:03) (16 - 18)  SpO2: 99% (10-04-19 @ 21:03) (97% - 99%)    Physical Exam:  General: NAD  CV: RRR, no murmurs, S1, S2  Resp: CTA-B, symmetrical expansion  Abdomen: Soft, non-tender, non-distended, gravid  Ext: no edema/tenderness in LE b/l    NST overnight: 135 baseline, mod variability, +accels, neg decels    Labs:    Blood Type: AB Positive  Antibody Screen: Negative  RPR: Negative               8.8    11.09 )-----------( 360      ( 10-04 @ 05:15 )             28.7     10-04-19 @ 05:15    137  |  106  |  6<L>             --------------------------< 87     3.2<L>  |  20<L>  | 0.43<L>    eGFR AA: 159  eGFR N-AA: 137    Calcium: 8.9  Phosphorus: --  Magnesium: --    AST: 21    ALT: 25  AlkPhos: 176<H>  Protein: 6.5  Albumin: 3.3  TBili: < 0.2<L>  D-Bili: --        MEDICATIONS  (STANDING):  Bonjesta 1 Tablet(s) 1 Tablet(s) Oral two times a day  docusate sodium 100 milliGRAM(s) Oral three times a day  ferrous    sulfate 325 milliGRAM(s) Oral two times a day  heparin  Injectable 5000 Unit(s) SubCutaneous every 12 hours  influenza   Vaccine 0.5 milliLiter(s) IntraMuscular once  labetalol 200 milliGRAM(s) Oral three times a day  NIFEdipine XL 30 milliGRAM(s) Oral daily  prenatal multivitamin 1 Tablet(s) Oral daily    MEDICATIONS  (PRN):  famotidine    Tablet 20 milliGRAM(s) Oral two times a day PRN indigestion

## 2019-10-06 NOTE — PROGRESS NOTE ADULT - SUBJECTIVE AND OBJECTIVE BOX
R3 Antepartum Progress Note - HD#7    Subjective  Patient seen and examined at bedside, no acute overnight events. This AM, patient reports no acute complaints. Patient reports good fetal movement. Denies LOF, VB, CTX. Denies HA, changes in vision, CP, SOB, palpitations, LE edema/tenderness.    Objective  Vital Signs Last 24 Hours  T(C): 36.8 (10-04-19 @ 21:03), Max: 37.1 (10-04-19 @ 09:37)  HR: 72 (10-05-19 @ 01:10) (72 - 93)  BP: 129/77 (10-05-19 @ 01:10) (126/76 - 157/92)  RR: 17 (10-04-19 @ 21:03) (16 - 18)  SpO2: 99% (10-04-19 @ 21:03) (97% - 99%)    Physical Exam:  General: NAD  CV: RRR  Resp: CTA-B, symmetrical expansion  Abdomen: Soft, non-tender, non-distended, gravid  Ext: no edema/tenderness in LE b/l    NST overnight: 135 baseline, mod variability, +accels, - decels    Labs:    Blood Type: AB Positive  Antibody Screen: Negative  RPR: Negative                               9.4    10.97 )-----------( 392      ( 05 Oct 2019 06:33 )             30.6   10-05    137  |  103  |  6<L>  ----------------------------<  85  3.3<L>   |  21<L>  |  0.48<L>    Ca    9.1      05 Oct 2019 06:33    TPro  6.5  /  Alb  3.2<L>  /  TBili  0.2  /  DBili  x   /  AST  17  /  ALT  21  /  AlkPhos  177<H>  10-05        MEDICATIONS  (STANDING):  Bonjesta 1 Tablet(s) 1 Tablet(s) Oral two times a day  docusate sodium 100 milliGRAM(s) Oral three times a day  ferrous    sulfate 325 milliGRAM(s) Oral two times a day  heparin  Injectable 5000 Unit(s) SubCutaneous every 12 hours  influenza   Vaccine 0.5 milliLiter(s) IntraMuscular once  labetalol 200 milliGRAM(s) Oral three times a day  NIFEdipine XL 30 milliGRAM(s) Oral daily  prenatal multivitamin 1 Tablet(s) Oral daily    MEDICATIONS  (PRN):  famotidine    Tablet 20 milliGRAM(s) Oral two times a day PRN indigestion

## 2019-10-07 ENCOUNTER — ASOB RESULT (OUTPATIENT)
Age: 29
End: 2019-10-07

## 2019-10-07 ENCOUNTER — OUTPATIENT (OUTPATIENT)
Dept: OUTPATIENT SERVICES | Facility: HOSPITAL | Age: 29
LOS: 1 days | End: 2019-10-07

## 2019-10-07 ENCOUNTER — APPOINTMENT (OUTPATIENT)
Dept: ANTEPARTUM | Facility: CLINIC | Age: 29
End: 2019-10-07

## 2019-10-07 ENCOUNTER — APPOINTMENT (OUTPATIENT)
Dept: ANTEPARTUM | Facility: HOSPITAL | Age: 29
End: 2019-10-07
Payer: COMMERCIAL

## 2019-10-07 LAB
ALBUMIN SERPL ELPH-MCNC: 3.5 G/DL — SIGNIFICANT CHANGE UP (ref 3.3–5)
ALP SERPL-CCNC: 185 U/L — HIGH (ref 40–120)
ALT FLD-CCNC: 12 U/L — SIGNIFICANT CHANGE UP (ref 4–33)
ANION GAP SERPL CALC-SCNC: 14 MMO/L — SIGNIFICANT CHANGE UP (ref 7–14)
APTT BLD: 28.3 SEC — SIGNIFICANT CHANGE UP (ref 27.5–36.3)
AST SERPL-CCNC: 14 U/L — SIGNIFICANT CHANGE UP (ref 4–32)
BASOPHILS # BLD AUTO: 0.06 K/UL — SIGNIFICANT CHANGE UP (ref 0–0.2)
BASOPHILS NFR BLD AUTO: 0.5 % — SIGNIFICANT CHANGE UP (ref 0–2)
BILIRUB SERPL-MCNC: 0.2 MG/DL — SIGNIFICANT CHANGE UP (ref 0.2–1.2)
BLD GP AB SCN SERPL QL: NEGATIVE — SIGNIFICANT CHANGE UP
BUN SERPL-MCNC: 5 MG/DL — LOW (ref 7–23)
CALCIUM SERPL-MCNC: 9.7 MG/DL — SIGNIFICANT CHANGE UP (ref 8.4–10.5)
CHLORIDE SERPL-SCNC: 102 MMOL/L — SIGNIFICANT CHANGE UP (ref 98–107)
CO2 SERPL-SCNC: 21 MMOL/L — LOW (ref 22–31)
CREAT ?TM UR-MCNC: 62.9 MG/DL — SIGNIFICANT CHANGE UP
CREAT SERPL-MCNC: 0.49 MG/DL — LOW (ref 0.5–1.3)
EOSINOPHIL # BLD AUTO: 0.22 K/UL — SIGNIFICANT CHANGE UP (ref 0–0.5)
EOSINOPHIL NFR BLD AUTO: 1.9 % — SIGNIFICANT CHANGE UP (ref 0–6)
FIBRINOGEN PPP-MCNC: 904.4 MG/DL — HIGH (ref 350–510)
GLUCOSE SERPL-MCNC: 86 MG/DL — SIGNIFICANT CHANGE UP (ref 70–99)
HCT VFR BLD CALC: 32.7 % — LOW (ref 34.5–45)
HGB BLD-MCNC: 10.1 G/DL — LOW (ref 11.5–15.5)
IMM GRANULOCYTES NFR BLD AUTO: 2.2 % — HIGH (ref 0–1.5)
INR BLD: 1.01 — SIGNIFICANT CHANGE UP (ref 0.88–1.17)
LYMPHOCYTES # BLD AUTO: 1.46 K/UL — SIGNIFICANT CHANGE UP (ref 1–3.3)
LYMPHOCYTES # BLD AUTO: 12.3 % — LOW (ref 13–44)
MCHC RBC-ENTMCNC: 26.1 PG — LOW (ref 27–34)
MCHC RBC-ENTMCNC: 30.9 % — LOW (ref 32–36)
MCV RBC AUTO: 84.5 FL — SIGNIFICANT CHANGE UP (ref 80–100)
MONOCYTES # BLD AUTO: 0.61 K/UL — SIGNIFICANT CHANGE UP (ref 0–0.9)
MONOCYTES NFR BLD AUTO: 5.1 % — SIGNIFICANT CHANGE UP (ref 2–14)
NEUTROPHILS # BLD AUTO: 9.25 K/UL — HIGH (ref 1.8–7.4)
NEUTROPHILS NFR BLD AUTO: 78 % — HIGH (ref 43–77)
NRBC # FLD: 0.03 K/UL — SIGNIFICANT CHANGE UP (ref 0–0)
PLATELET # BLD AUTO: 408 K/UL — HIGH (ref 150–400)
PMV BLD: 9.2 FL — SIGNIFICANT CHANGE UP (ref 7–13)
POTASSIUM SERPL-MCNC: 3.9 MMOL/L — SIGNIFICANT CHANGE UP (ref 3.5–5.3)
POTASSIUM SERPL-SCNC: 3.9 MMOL/L — SIGNIFICANT CHANGE UP (ref 3.5–5.3)
PROT SERPL-MCNC: 7.2 G/DL — SIGNIFICANT CHANGE UP (ref 6–8.3)
PROTHROM AB SERPL-ACNC: 11.2 SEC — SIGNIFICANT CHANGE UP (ref 9.8–13.1)
RBC # BLD: 3.87 M/UL — SIGNIFICANT CHANGE UP (ref 3.8–5.2)
RBC # FLD: 18 % — HIGH (ref 10.3–14.5)
RH IG SCN BLD-IMP: POSITIVE — SIGNIFICANT CHANGE UP
SODIUM SERPL-SCNC: 137 MMOL/L — SIGNIFICANT CHANGE UP (ref 135–145)
URATE SERPL-MCNC: 3.3 MG/DL — SIGNIFICANT CHANGE UP (ref 2.5–7)
WBC # BLD: 11.86 K/UL — HIGH (ref 3.8–10.5)
WBC # FLD AUTO: 11.86 K/UL — HIGH (ref 3.8–10.5)

## 2019-10-07 PROCEDURE — 76819 FETAL BIOPHYS PROFIL W/O NST: CPT | Mod: 26

## 2019-10-07 NOTE — PROGRESS NOTE ADULT - ATTENDING COMMENTS
pc/s scheduled for 10/10, recheck presentation prior, should fetus be vtx, will proceed with IOL
Patient seen at bedside. No complaints this morning. Reports active FM. Denies abdominal pain/contractions, VB, LOF. Denies HA, vision changes, RUQ/epigastric pain. BPs reviewed - 120-140/70-80. Continue labetalol 200mg TID, Procarida 30mg XL daily. Plan for PCS on 10/10 for breech presentation. Dr. Blue discussed ECV again with patient but she continues to decline.
Patient seen at bedside. No complaints this morning. Denies contractions, vaginal bleeding, LOF. Reports active FM. Denies HA, vision changes, RUQ/epigastric pain. BPs reviewed = controlled on PO meds. Continue labetalol and procardia. Plan for delivery at 34 weeks. Currently breech presenting. Had discussed attempting ECV with patient, patient declining procedure, prefers to proceed with  delivery if baby is still breech. Currently scheduled for PCS on 10/10/19.
pt asymptomatic, hellp labs unremarkable, bps improved. continue plan as above

## 2019-10-07 NOTE — DIETITIAN INITIAL EVALUATION ADULT. - OTHER INFO
Per chart review patient 28 year old female,  BECCA 19 @ 33w5d weeks presenting with elevated BPs. Patient reports good PO intake/appetite prior to admission. Reports having nausea/vomiting during pregnancy and was started on Bonjesta prior to admission to help alleviate GI distress. States she is tolerating PO intake well. Reports if she takes multivitamin or iron supplement without food her stomach will become uneasy so she takes them after meals. Patient noted with food allergy to shellfish and seafood however patient reports she has no food allergies and unsure why food allergies are noted. On bowel regimen medications to help alleviate constipation.

## 2019-10-07 NOTE — DIETITIAN INITIAL EVALUATION ADULT. - ENERGY NEEDS
Height (cm): 162.56  Weight (kg): 69.4 (30 Sep 2019, admit)  BMI (kg/m2): 26.3   IBW: 54.5kg +/-10% *IBW used to calculate protein needs.

## 2019-10-07 NOTE — DIETITIAN INITIAL EVALUATION ADULT. - RD TO REMAIN AVAILABLE
1. Continue regular diet. 2. Continue prenatal multivitamin daily. 3. Please Encourage po intake, assist with meals and menu selections, provide alternatives PRN. 4. Continue anti-nausea medication.

## 2019-10-07 NOTE — DIETITIAN INITIAL EVALUATION ADULT. - PERTINENT LABORATORY DATA
10-07 Na 137 mmol/L Glu 86 mg/dL K+ 3.9 mmol/L Cr 0.49 mg/dL<L> BUN 5 mg/dL<L> Phos n/a   Alb 3.5 g/dL PAB n/a   Hgb 10.1 g/dL<L> Hct 32.7 %<L> HgA1C n/a    Glucose, Serum: 86 mg/dL

## 2019-10-07 NOTE — DIETITIAN INITIAL EVALUATION ADULT. - PERTINENT MEDS FT
Bonjesta 1 Tablet(s)  docusate sodium  famotidine    Tablet PRN  ferrous    sulfate  heparin  Injectable  influenza   Vaccine  labetalol  NIFEdipine XL  prenatal multivitamin

## 2019-10-07 NOTE — PROGRESS NOTE ADULT - SUBJECTIVE AND OBJECTIVE BOX
R3 Note  HD# 8    INTERVAL HPI/OVERNIGHT EVENTS: Pt seen and examined at bedside.  Pt without complaints.  Ambulating, passing flatus, tolerating regular diet, pain controlled with analgesia, urinating spontaneously  She denies nausea/vomiting/fever/chills/chest pain/SOB/dizziness.    MEDICATIONS  (STANDING):  Bonjesta 1 Tablet(s) 1 Tablet(s) Oral two times a day  docusate sodium 100 milliGRAM(s) Oral three times a day  ferrous    sulfate 325 milliGRAM(s) Oral two times a day  heparin  Injectable 5000 Unit(s) SubCutaneous every 12 hours  influenza   Vaccine 0.5 milliLiter(s) IntraMuscular once  labetalol 200 milliGRAM(s) Oral three times a day  NIFEdipine XL 30 milliGRAM(s) Oral daily  prenatal multivitamin 1 Tablet(s) Oral daily    MEDICATIONS  (PRN):  famotidine    Tablet 20 milliGRAM(s) Oral two times a day PRN indigestion      12 point ROS negative except as outlined above    Vital Signs Last 24 Hrs  T(C): 36.7 (07 Oct 2019 06:07), Max: 37.3 (06 Oct 2019 17:42)  T(F): 98 (07 Oct 2019 06:07), Max: 99.2 (06 Oct 2019 17:42)  HR: 81 (07 Oct 2019 06:07) (71 - 96)  BP: 130/78 (07 Oct 2019 06:07) (127/77 - 148/97)  BP(mean): --  RR: 19 (07 Oct 2019 06:07) (16 - 20)  SpO2: 97% (07 Oct 2019 06:07) (91% - 100%)    I&O's Summary    06 Oct 2019 07:01  -  07 Oct 2019 06:54  --------------------------------------------------------  IN: 500 mL / OUT: 0 mL / NET: 500 mL          PHYSICAL EXAM:    GA: NAD, A+0 x 3  CV: RRR  Pulm: CTA BL  Abd: soft, nontender, nondistended, no rebound or guarding,   : lochia WNL  Extremities: no swelling or calf tenderness  Lines:      LABS:                          9.4    10.97 )-----------( 392      ( 05 Oct 2019 06:33 )             30.6   baso 0.5    eos 2.4    imm gran 3.9    lymph 13.8   mono 5.3    poly 74.1                   RADIOLOGY & ADDITIONAL TESTS:

## 2019-10-08 NOTE — PROGRESS NOTE ADULT - SUBJECTIVE AND OBJECTIVE BOX
R3 Backnote  HD#9    INTERVAL HPI/OVERNIGHT EVENTS: Pt seen and examined at bedside.  Pt without complaints. Denies, headache, blurry vision, and spots in her vision.  Ambulating, passing flatus, tolerating regular diet, pain controlled with analgesia, urinating spontaneously  She denies nausea/vomiting/fever/chills/chest pain/SOB/dizziness.    MEDICATIONS  (STANDING):  Bonjesta 1 Tablet(s) 1 Tablet(s) Oral two times a day  docusate sodium 100 milliGRAM(s) Oral three times a day  ferrous    sulfate 325 milliGRAM(s) Oral two times a day  heparin  Injectable 5000 Unit(s) SubCutaneous every 12 hours  influenza   Vaccine 0.5 milliLiter(s) IntraMuscular once  labetalol 200 milliGRAM(s) Oral three times a day  NIFEdipine XL 30 milliGRAM(s) Oral daily  prenatal multivitamin 1 Tablet(s) Oral daily    MEDICATIONS  (PRN):  famotidine    Tablet 20 milliGRAM(s) Oral two times a day PRN indigestion      12 point ROS negative except as outlined above    Vital Signs Last 24 Hrs  T(C): 37.1 (08 Oct 2019 18:07), Max: 37.2 (08 Oct 2019 09:45)  T(F): 98.8 (08 Oct 2019 18:07), Max: 99 (08 Oct 2019 09:45)  HR: 85 (08 Oct 2019 18:07) (74 - 90)  BP: 127/77 (08 Oct 2019 18:07) (120/70 - 137/87)  BP(mean): --  RR: 16 (08 Oct 2019 18:07) (16 - 18)  SpO2: 99% (08 Oct 2019 18:07) (97% - 99%)    I&O's Summary        PHYSICAL EXAM:    GA: NAD, A+0 x 3  CV: RRR  Pulm: CTA BL  Abd: soft, nontender, nondistended, no rebound or guarding,   : lochia WNL  Extremities: no swelling or calf tenderness  Lines:      LABS:                          10.1   11.86 )-----------( 408      ( 07 Oct 2019 08:07 )             32.7   baso 0.5    eos 1.9    imm gran 2.2    lymph 12.3   mono 5.1    poly 78.0         PT/INR - ( 07 Oct 2019 08:07 )   PT: 11.2 SEC;   INR: 1.01          PTT - ( 07 Oct 2019 08:07 )  PTT:28.3 SEC          RADIOLOGY & ADDITIONAL TESTS:

## 2019-10-09 ENCOUNTER — TRANSCRIPTION ENCOUNTER (OUTPATIENT)
Age: 29
End: 2019-10-09

## 2019-10-10 ENCOUNTER — RESULT REVIEW (OUTPATIENT)
Age: 29
End: 2019-10-10

## 2019-10-10 ENCOUNTER — APPOINTMENT (OUTPATIENT)
Dept: ANTEPARTUM | Facility: HOSPITAL | Age: 29
End: 2019-10-10

## 2019-10-10 ENCOUNTER — TRANSCRIPTION ENCOUNTER (OUTPATIENT)
Age: 29
End: 2019-10-10

## 2019-10-10 ENCOUNTER — APPOINTMENT (OUTPATIENT)
Dept: ANTEPARTUM | Facility: CLINIC | Age: 29
End: 2019-10-10

## 2019-10-10 DIAGNOSIS — O99.013 ANEMIA COMPLICATING PREGNANCY, THIRD TRIMESTER: ICD-10-CM

## 2019-10-10 DIAGNOSIS — O41.93X0 DISORDER OF AMNIOTIC FLUID AND MEMBRANES, UNSPECIFIED, THIRD TRIMESTER, NOT APPLICABLE OR UNSPECIFIED: ICD-10-CM

## 2019-10-10 DIAGNOSIS — O13.3 GESTATIONAL [PREGNANCY-INDUCED] HYPERTENSION WITHOUT SIGNIFICANT PROTEINURIA, THIRD TRIMESTER: ICD-10-CM

## 2019-10-10 LAB
ALBUMIN SERPL ELPH-MCNC: 3.2 G/DL — LOW (ref 3.3–5)
ALP SERPL-CCNC: 186 U/L — HIGH (ref 40–120)
ALT FLD-CCNC: 16 U/L — SIGNIFICANT CHANGE UP (ref 4–33)
ANION GAP SERPL CALC-SCNC: 11 MMO/L — SIGNIFICANT CHANGE UP (ref 7–14)
APTT BLD: 25.5 SEC — LOW (ref 27.5–36.3)
AST SERPL-CCNC: 17 U/L — SIGNIFICANT CHANGE UP (ref 4–32)
BASOPHILS # BLD AUTO: 0.04 K/UL — SIGNIFICANT CHANGE UP (ref 0–0.2)
BASOPHILS NFR BLD AUTO: 0.4 % — SIGNIFICANT CHANGE UP (ref 0–2)
BILIRUB SERPL-MCNC: < 0.2 MG/DL — LOW (ref 0.2–1.2)
BLD GP AB SCN SERPL QL: NEGATIVE — SIGNIFICANT CHANGE UP
BUN SERPL-MCNC: 7 MG/DL — SIGNIFICANT CHANGE UP (ref 7–23)
CALCIUM SERPL-MCNC: 9.3 MG/DL — SIGNIFICANT CHANGE UP (ref 8.4–10.5)
CHLORIDE SERPL-SCNC: 106 MMOL/L — SIGNIFICANT CHANGE UP (ref 98–107)
CO2 SERPL-SCNC: 19 MMOL/L — LOW (ref 22–31)
CREAT ?TM UR-MCNC: 44.1 MG/DL — SIGNIFICANT CHANGE UP
CREAT SERPL-MCNC: 0.48 MG/DL — LOW (ref 0.5–1.3)
EOSINOPHIL # BLD AUTO: 0.18 K/UL — SIGNIFICANT CHANGE UP (ref 0–0.5)
EOSINOPHIL NFR BLD AUTO: 1.7 % — SIGNIFICANT CHANGE UP (ref 0–6)
FIBRINOGEN PPP-MCNC: > 985 MG/DL — HIGH (ref 350–510)
GLUCOSE SERPL-MCNC: 83 MG/DL — SIGNIFICANT CHANGE UP (ref 70–99)
HCT VFR BLD CALC: 31.2 % — LOW (ref 34.5–45)
HGB BLD-MCNC: 9.6 G/DL — LOW (ref 11.5–15.5)
IMM GRANULOCYTES NFR BLD AUTO: 1.5 % — SIGNIFICANT CHANGE UP (ref 0–1.5)
INR BLD: 0.94 — SIGNIFICANT CHANGE UP (ref 0.88–1.17)
LDH SERPL L TO P-CCNC: 184 U/L — SIGNIFICANT CHANGE UP (ref 135–225)
LYMPHOCYTES # BLD AUTO: 1.34 K/UL — SIGNIFICANT CHANGE UP (ref 1–3.3)
LYMPHOCYTES # BLD AUTO: 13 % — SIGNIFICANT CHANGE UP (ref 13–44)
MAGNESIUM SERPL-MCNC: 5.8 MG/DL — HIGH (ref 1.6–2.6)
MCHC RBC-ENTMCNC: 26.2 PG — LOW (ref 27–34)
MCHC RBC-ENTMCNC: 30.8 % — LOW (ref 32–36)
MCV RBC AUTO: 85.2 FL — SIGNIFICANT CHANGE UP (ref 80–100)
MONOCYTES # BLD AUTO: 0.68 K/UL — SIGNIFICANT CHANGE UP (ref 0–0.9)
MONOCYTES NFR BLD AUTO: 6.6 % — SIGNIFICANT CHANGE UP (ref 2–14)
NEUTROPHILS # BLD AUTO: 7.92 K/UL — HIGH (ref 1.8–7.4)
NEUTROPHILS NFR BLD AUTO: 76.8 % — SIGNIFICANT CHANGE UP (ref 43–77)
NRBC # FLD: 0 K/UL — SIGNIFICANT CHANGE UP (ref 0–0)
PLATELET # BLD AUTO: 387 K/UL — SIGNIFICANT CHANGE UP (ref 150–400)
PMV BLD: 9.3 FL — SIGNIFICANT CHANGE UP (ref 7–13)
POTASSIUM SERPL-MCNC: 3.8 MMOL/L — SIGNIFICANT CHANGE UP (ref 3.5–5.3)
POTASSIUM SERPL-SCNC: 3.8 MMOL/L — SIGNIFICANT CHANGE UP (ref 3.5–5.3)
PROT SERPL-MCNC: 6.8 G/DL — SIGNIFICANT CHANGE UP (ref 6–8.3)
PROTHROM AB SERPL-ACNC: 10.7 SEC — SIGNIFICANT CHANGE UP (ref 9.8–13.1)
RBC # BLD: 3.66 M/UL — LOW (ref 3.8–5.2)
RBC # FLD: 18.7 % — HIGH (ref 10.3–14.5)
RH IG SCN BLD-IMP: POSITIVE — SIGNIFICANT CHANGE UP
SODIUM SERPL-SCNC: 136 MMOL/L — SIGNIFICANT CHANGE UP (ref 135–145)
URATE SERPL-MCNC: 3.7 MG/DL — SIGNIFICANT CHANGE UP (ref 2.5–7)
WBC # BLD: 10.31 K/UL — SIGNIFICANT CHANGE UP (ref 3.8–10.5)
WBC # FLD AUTO: 10.31 K/UL — SIGNIFICANT CHANGE UP (ref 3.8–10.5)

## 2019-10-10 PROCEDURE — 88305 TISSUE EXAM BY PATHOLOGIST: CPT | Mod: 26

## 2019-10-10 PROCEDURE — 88307 TISSUE EXAM BY PATHOLOGIST: CPT | Mod: 26

## 2019-10-10 RX ORDER — OXYCODONE HYDROCHLORIDE 5 MG/1
5 TABLET ORAL
Refills: 0 | Status: DISCONTINUED | OUTPATIENT
Start: 2019-10-10 | End: 2019-10-13

## 2019-10-10 RX ORDER — LANOLIN
1 OINTMENT (GRAM) TOPICAL EVERY 6 HOURS
Refills: 0 | Status: DISCONTINUED | OUTPATIENT
Start: 2019-10-10 | End: 2019-10-13

## 2019-10-10 RX ORDER — DIPHENHYDRAMINE HCL 50 MG
25 CAPSULE ORAL EVERY 6 HOURS
Refills: 0 | Status: DISCONTINUED | OUTPATIENT
Start: 2019-10-10 | End: 2019-10-13

## 2019-10-10 RX ORDER — FAMOTIDINE 10 MG/ML
20 INJECTION INTRAVENOUS ONCE
Refills: 0 | Status: COMPLETED | OUTPATIENT
Start: 2019-10-10 | End: 2019-10-10

## 2019-10-10 RX ORDER — CEPHALEXIN 500 MG
500 CAPSULE ORAL EVERY 12 HOURS
Refills: 0 | Status: DISCONTINUED | OUTPATIENT
Start: 2019-10-10 | End: 2019-10-13

## 2019-10-10 RX ORDER — SIMETHICONE 80 MG/1
80 TABLET, CHEWABLE ORAL EVERY 4 HOURS
Refills: 0 | Status: DISCONTINUED | OUTPATIENT
Start: 2019-10-10 | End: 2019-10-13

## 2019-10-10 RX ORDER — SODIUM CHLORIDE 9 MG/ML
1000 INJECTION, SOLUTION INTRAVENOUS
Refills: 0 | Status: DISCONTINUED | OUTPATIENT
Start: 2019-10-10 | End: 2019-10-10

## 2019-10-10 RX ORDER — OXYTOCIN 10 UNIT/ML
333.33 VIAL (ML) INJECTION
Qty: 20 | Refills: 0 | Status: DISCONTINUED | OUTPATIENT
Start: 2019-10-10 | End: 2019-10-10

## 2019-10-10 RX ORDER — METOCLOPRAMIDE HCL 10 MG
10 TABLET ORAL ONCE
Refills: 0 | Status: COMPLETED | OUTPATIENT
Start: 2019-10-10 | End: 2019-10-10

## 2019-10-10 RX ORDER — CITRIC ACID/SODIUM CITRATE 300-500 MG
30 SOLUTION, ORAL ORAL ONCE
Refills: 0 | Status: COMPLETED | OUTPATIENT
Start: 2019-10-10 | End: 2019-10-10

## 2019-10-10 RX ORDER — SODIUM CHLORIDE 9 MG/ML
1000 INJECTION, SOLUTION INTRAVENOUS ONCE
Refills: 0 | Status: DISCONTINUED | OUTPATIENT
Start: 2019-10-10 | End: 2019-10-10

## 2019-10-10 RX ORDER — MAGNESIUM SULFATE 500 MG/ML
2 VIAL (ML) INJECTION
Qty: 40 | Refills: 0 | Status: DISCONTINUED | OUTPATIENT
Start: 2019-10-10 | End: 2019-10-11

## 2019-10-10 RX ORDER — SODIUM CHLORIDE 9 MG/ML
1000 INJECTION, SOLUTION INTRAVENOUS
Refills: 0 | Status: DISCONTINUED | OUTPATIENT
Start: 2019-10-10 | End: 2019-10-11

## 2019-10-10 RX ORDER — KETOROLAC TROMETHAMINE 30 MG/ML
30 SYRINGE (ML) INJECTION EVERY 6 HOURS
Refills: 0 | Status: DISCONTINUED | OUTPATIENT
Start: 2019-10-10 | End: 2019-10-11

## 2019-10-10 RX ORDER — OXYCODONE HYDROCHLORIDE 5 MG/1
5 TABLET ORAL ONCE
Refills: 0 | Status: DISCONTINUED | OUTPATIENT
Start: 2019-10-10 | End: 2019-10-13

## 2019-10-10 RX ORDER — MAGNESIUM HYDROXIDE 400 MG/1
30 TABLET, CHEWABLE ORAL
Refills: 0 | Status: DISCONTINUED | OUTPATIENT
Start: 2019-10-10 | End: 2019-10-13

## 2019-10-10 RX ORDER — ACETAMINOPHEN 500 MG
975 TABLET ORAL
Refills: 0 | Status: DISCONTINUED | OUTPATIENT
Start: 2019-10-10 | End: 2019-10-13

## 2019-10-10 RX ORDER — DOCUSATE SODIUM 100 MG
100 CAPSULE ORAL
Refills: 0 | Status: DISCONTINUED | OUTPATIENT
Start: 2019-10-10 | End: 2019-10-13

## 2019-10-10 RX ORDER — OXYTOCIN 10 UNIT/ML
16.67 VIAL (ML) INJECTION
Qty: 20 | Refills: 0 | Status: DISCONTINUED | OUTPATIENT
Start: 2019-10-10 | End: 2019-10-11

## 2019-10-10 RX ORDER — IBUPROFEN 200 MG
600 TABLET ORAL EVERY 6 HOURS
Refills: 0 | Status: COMPLETED | OUTPATIENT
Start: 2019-10-10 | End: 2020-09-07

## 2019-10-10 RX ORDER — TETANUS TOXOID, REDUCED DIPHTHERIA TOXOID AND ACELLULAR PERTUSSIS VACCINE, ADSORBED 5; 2.5; 8; 8; 2.5 [IU]/.5ML; [IU]/.5ML; UG/.5ML; UG/.5ML; UG/.5ML
0.5 SUSPENSION INTRAMUSCULAR ONCE
Refills: 0 | Status: DISCONTINUED | OUTPATIENT
Start: 2019-10-10 | End: 2019-10-13

## 2019-10-10 RX ORDER — GLYCERIN ADULT
1 SUPPOSITORY, RECTAL RECTAL AT BEDTIME
Refills: 0 | Status: DISCONTINUED | OUTPATIENT
Start: 2019-10-10 | End: 2019-10-13

## 2019-10-10 RX ORDER — MAGNESIUM SULFATE 500 MG/ML
4 VIAL (ML) INJECTION ONCE
Refills: 0 | Status: COMPLETED | OUTPATIENT
Start: 2019-10-10 | End: 2019-10-10

## 2019-10-10 RX ADMIN — Medication 500 MILLIGRAM(S): at 19:59

## 2019-10-10 RX ADMIN — Medication 30 MILLIGRAM(S): at 21:10

## 2019-10-10 RX ADMIN — FAMOTIDINE 20 MILLIGRAM(S): 10 INJECTION INTRAVENOUS at 11:11

## 2019-10-10 RX ADMIN — Medication 50 GM/HR: at 19:15

## 2019-10-10 RX ADMIN — Medication 30 MILLIGRAM(S): at 20:38

## 2019-10-10 RX ADMIN — SODIUM CHLORIDE 50 MILLILITER(S): 9 INJECTION, SOLUTION INTRAVENOUS at 18:11

## 2019-10-10 RX ADMIN — Medication 30 MILLIGRAM(S): at 16:34

## 2019-10-10 RX ADMIN — SODIUM CHLORIDE 125 MILLILITER(S): 9 INJECTION, SOLUTION INTRAVENOUS at 01:12

## 2019-10-10 RX ADMIN — Medication 50 MILLIUNIT(S)/MIN: at 14:00

## 2019-10-10 RX ADMIN — SODIUM CHLORIDE 125 MILLILITER(S): 9 INJECTION, SOLUTION INTRAVENOUS at 11:11

## 2019-10-10 RX ADMIN — Medication 30 MILLILITER(S): at 11:12

## 2019-10-10 RX ADMIN — Medication 50 GM/HR: at 21:15

## 2019-10-10 RX ADMIN — Medication 30 MILLIGRAM(S): at 14:33

## 2019-10-10 RX ADMIN — Medication 50 GM/HR: at 14:22

## 2019-10-10 RX ADMIN — Medication 10 MILLIGRAM(S): at 11:11

## 2019-10-10 RX ADMIN — Medication 200 GRAM(S): at 13:55

## 2019-10-10 NOTE — DISCHARGE NOTE OB - CARE PROVIDER_API CALL
Caesar Morillo)  Gynecology Obstetrics  Gynecology  [address] [place] [address]  Phone: [phone]  Fax: [phone]  Follow Up Time:

## 2019-10-10 NOTE — DISCHARGE NOTE OB - PATIENT PORTAL LINK FT
You can access the FollowMyHealth Patient Portal offered by [facility] by registering at the following website: [email or web address]. By joining [employer]’s FollowMyHealth portal, you will also be able to view your health information using other applications (apps) compatible with our system.

## 2019-10-10 NOTE — BRIEF OPERATIVE NOTE - OPERATION/FINDINGS
viable male infant, APGARS 9/9, 2080g, breech presentation, large submucosal degenerating fibroid noted, small 1cm fundal subserosal fibroid, grossly normal tubes and ovaries

## 2019-10-10 NOTE — DISCHARGE NOTE OB - MEDICATION SUMMARY - MEDICATIONS TO TAKE
I will START or STAY ON the medications listed below when I get home from the hospital:    labetalol 200 mg oral tablet  -- 1 tab(s) by mouth 3 times a day  -- Indication: For Pre-eclampsia

## 2019-10-10 NOTE — DISCHARGE NOTE OB - CARE PLAN
Principal Discharge DX:	 delivery delivered  Goal:	recovery  Assessment and plan of treatment:	recovery  Secondary Diagnosis:	Preeclampsia, severe, third trimester Principal Discharge DX:	 delivery delivered  Goal:	recovery  Assessment and plan of treatment:	recovery  Secondary Diagnosis:	Preeclampsia, severe, third trimester  Assessment and plan of treatment:	Procardia 30mg xl daily  Labetalol 200mg TID  Goal:	carbuncle  Assessment and plan of treatment:	take antibiotics as prescribed

## 2019-10-10 NOTE — PROGRESS NOTE ADULT - SUBJECTIVE AND OBJECTIVE BOX
R3 Backnote  HD#11    INTERVAL HPI/OVERNIGHT EVENTS: Pt seen and examined at bedside.  Pt complains of left groin pain since last night.  Ambulating, passing flatus, tolerating regular diet, pain controlled with analgesia, urinating spontaneously  She denies nausea/vomiting/fever/chills/chest pain/SOB/dizziness.    MEDICATIONS  (STANDING):  acetaminophen   Tablet .. 975 milliGRAM(s) Oral <User Schedule>  Bonjesta 1 Tablet(s) 1 Tablet(s) Oral two times a day  cephalexin 500 milliGRAM(s) Oral every 12 hours  diphtheria/tetanus/pertussis (acellular) Vaccine (ADAcel) 0.5 milliLiter(s) IntraMuscular once  docusate sodium 100 milliGRAM(s) Oral three times a day  ferrous    sulfate 325 milliGRAM(s) Oral two times a day  heparin  Injectable 5000 Unit(s) SubCutaneous every 12 hours  ibuprofen  Tablet. 600 milliGRAM(s) Oral every 6 hours  influenza   Vaccine 0.5 milliLiter(s) IntraMuscular once  ketorolac   Injectable 30 milliGRAM(s) IV Push every 6 hours  labetalol 200 milliGRAM(s) Oral three times a day  lactated ringers. 1000 milliLiter(s) (50 mL/Hr) IV Continuous <Continuous>  magnesium sulfate Infusion 2 Gm/Hr (50 mL/Hr) IV Continuous <Continuous>  NIFEdipine XL 30 milliGRAM(s) Oral daily  oxytocin Infusion 333.333 milliUNIT(s)/Min (1000 mL/Hr) IV Continuous <Continuous>  prenatal multivitamin 1 Tablet(s) Oral daily    MEDICATIONS  (PRN):  diphenhydrAMINE 25 milliGRAM(s) Oral every 6 hours PRN Itching  docusate sodium 100 milliGRAM(s) Oral two times a day PRN Stool softening  glycerin Suppository - Adult 1 Suppository(s) Rectal at bedtime PRN Constipation  lanolin Ointment 1 Application(s) Topical every 6 hours PRN Sore Nipples  magnesium hydroxide Suspension 30 milliLiter(s) Oral two times a day PRN Constipation  oxyCODONE    IR 5 milliGRAM(s) Oral every 3 hours PRN Moderate to Severe Pain (4-10)  oxyCODONE    IR 5 milliGRAM(s) Oral once PRN Moderate to Severe Pain (4-10)  simethicone 80 milliGRAM(s) Chew every 4 hours PRN Gas      12 point ROS negative except as outlined above    Vital Signs Last 24 Hrs  T(C): 36.4 (10 Oct 2019 13:05), Max: 37.2 (10 Oct 2019 09:29)  T(F): 97.5 (10 Oct 2019 13:05), Max: 98.96 (10 Oct 2019 09:29)  HR: 76 (10 Oct 2019 20:00) (69 - 89)  BP: 123/74 (10 Oct 2019 20:00) (112/67 - 148/82)  BP(mean): 86 (10 Oct 2019 20:00) (82 - 99)  RR: 20 (10 Oct 2019 20:00) (16 - 24)  SpO2: 99% (10 Oct 2019 20:00) (97% - 100%)    I&O's Summary    09 Oct 2019 07:01  -  10 Oct 2019 07:00  --------------------------------------------------------  IN: 875 mL / OUT: 0 mL / NET: 875 mL    10 Oct 2019 07:01  -  10 Oct 2019 20:47  --------------------------------------------------------  IN: 3925 mL / OUT: 2450 mL / NET: 1475 mL          PHYSICAL EXAM:    GA: NAD, A+0 x 3  CV: RRR  Pulm: CTA BL  TAS: breech presentation, back up  Abd: soft, nontender, nondistended, no rebound or guarding,   Extremities: no swelling or calf tenderness, 3cm tender, erythematous area on left inner thigh near her groin with fluctuance coming to a head  Lines:      LABS:                          9.6    10.31 )-----------( 387      ( 10 Oct 2019 06:23 )             31.2   baso 0.4    eos 1.7    imm gran 1.5    lymph 13.0   mono 6.6    poly 76.8         PT/INR - ( 10 Oct 2019 06:23 )   PT: 10.7 SEC;   INR: 0.94          PTT - ( 10 Oct 2019 06:23 )  PTT:25.5 SEC          RADIOLOGY & ADDITIONAL TESTS:

## 2019-10-10 NOTE — OB NEONATOLOGY/PEDIATRICIAN DELIVERY SUMMARY - NSPEDSNEONOTESA_OBGYN_ALL_OB_FT
This is a 34.1 week male born to a 29 yo  mother via primary  for PEC and breech presentation.  Mother admitted on  with c/o blurry vision and elevated BP, rec'd mag, labetalol and beta x 2.  Maternal history of fibroids, kidney stones, and anxiety (no meds).  OB hx of SAB with d&c in .  PNL neg/NR/Imm, GBS neg , blood type AB pos.  Baby born breech presentation with stim and suction by OB with good color and cry.  Delayed cord clamping x 30 seconds.  Brought to warmer, WDSS, temp probe and pulse Ox placed.  Apgars 9,9.  Grunting at 7 MOL, CPAP 5, 21% started.  Baby transported to NICU in heated warmer and Admitted on CPAP.

## 2019-10-10 NOTE — BRIEF OPERATIVE NOTE - NSICDXBRIEFPOSTOP_GEN_ALL_CORE_FT
POST-OP DIAGNOSIS:  Breech presentation 10-Oct-2019 20:55:14  Mathew Cordero  Severe preeclampsia, third trimester 10-Oct-2019 20:55:09  Mathew Cordero

## 2019-10-10 NOTE — OB RN DELIVERY SUMMARY - BABY A: APGAR 1 MIN REFLEX IRRITABILITY, DELIVERY
Surgery Postop Note    Svetlana Adair presents today for surgical followup.  she is doing well following right axillary dissection for recurrent breast cancer.  She does have a recurrent seroma and I have elected to aspirate it at this point.  I removed approximately 150 mL of clear fluid.  I would like to see her back on Friday for possible repeat aspiration.  Thank you for the opportunity to help in her care.    Yann White M.D.  [facility]  [phone]    Please route or send letter to:  Primary Care Provider (PCP) and Referring Provider       (2) cough or sneeze

## 2019-10-10 NOTE — DISCHARGE NOTE OB - MATERIALS PROVIDED
Breastfeeding Guide and Packet/Discharge Medication Information for Patients and Families Pocket Guide/[facility] [place] Screening Program/[facility] Hearing Screen Program/Breastfeeding Mother’s Support Group Information/Guide to Postpartum Care/[place]  Immunization Record/Breastfeeding Log/Back To Sleep Handout/Shaken Baby Prevention Handout/Birth Certificate Instructions

## 2019-10-11 LAB
MAGNESIUM SERPL-MCNC: 6.5 MG/DL — HIGH (ref 1.6–2.6)
MAGNESIUM SERPL-MCNC: 6.5 MG/DL — HIGH (ref 1.6–2.6)

## 2019-10-11 RX ORDER — BACITRACIN ZINC NEOMYCIN SULFATE POLYMYXIN B SULFATE 400; 3.5; 5 [IU]/G; MG/G; [IU]/G
1 OINTMENT TOPICAL THREE TIMES A DAY
Refills: 0 | Status: DISCONTINUED | OUTPATIENT
Start: 2019-10-11 | End: 2019-10-13

## 2019-10-11 RX ORDER — IBUPROFEN 200 MG
600 TABLET ORAL EVERY 6 HOURS
Refills: 0 | Status: DISCONTINUED | OUTPATIENT
Start: 2019-10-11 | End: 2019-10-13

## 2019-10-11 RX ADMIN — Medication 100 MILLIGRAM(S): at 17:38

## 2019-10-11 RX ADMIN — Medication 500 MILLIGRAM(S): at 17:39

## 2019-10-11 RX ADMIN — SIMETHICONE 80 MILLIGRAM(S): 80 TABLET, CHEWABLE ORAL at 06:30

## 2019-10-11 RX ADMIN — Medication 30 MILLIGRAM(S): at 02:00

## 2019-10-11 RX ADMIN — Medication 975 MILLIGRAM(S): at 17:41

## 2019-10-11 RX ADMIN — SIMETHICONE 80 MILLIGRAM(S): 80 TABLET, CHEWABLE ORAL at 17:38

## 2019-10-11 RX ADMIN — Medication 500 MILLIGRAM(S): at 06:42

## 2019-10-11 RX ADMIN — BACITRACIN ZINC NEOMYCIN SULFATE POLYMYXIN B SULFATE 1 APPLICATION(S): 400; 3.5; 5 OINTMENT TOPICAL at 16:00

## 2019-10-11 RX ADMIN — Medication 50 GM/HR: at 07:31

## 2019-10-11 RX ADMIN — Medication 600 MILLIGRAM(S): at 21:42

## 2019-10-11 RX ADMIN — Medication 30 MILLIGRAM(S): at 08:17

## 2019-10-11 RX ADMIN — Medication 30 MILLIGRAM(S): at 08:30

## 2019-10-11 RX ADMIN — Medication 600 MILLIGRAM(S): at 13:51

## 2019-10-11 RX ADMIN — SIMETHICONE 80 MILLIGRAM(S): 80 TABLET, CHEWABLE ORAL at 21:41

## 2019-10-11 RX ADMIN — Medication 975 MILLIGRAM(S): at 18:15

## 2019-10-11 RX ADMIN — Medication 600 MILLIGRAM(S): at 22:30

## 2019-10-11 RX ADMIN — Medication 50 GM/HR: at 11:05

## 2019-10-11 RX ADMIN — BACITRACIN ZINC NEOMYCIN SULFATE POLYMYXIN B SULFATE 1 APPLICATION(S): 400; 3.5; 5 OINTMENT TOPICAL at 22:00

## 2019-10-11 RX ADMIN — Medication 30 MILLIGRAM(S): at 02:30

## 2019-10-11 RX ADMIN — Medication 600 MILLIGRAM(S): at 14:20

## 2019-10-11 NOTE — PROGRESS NOTE ADULT - SUBJECTIVE AND OBJECTIVE BOX
Post-Operative Note, C/S  She is a  28y woman who is now post-operative day:     Subjective:  The patient feels well.  She is ambulating.   She is tolerating regular diet.  She denies nausea and vomiting; denies fever.  She is voiding.  Her pain is controlled; incisional pain is appropriate.  She reports normal postpartum bleeding.  She is breastfeeding.  She is formula feeding.    Physical exam:    Vital Signs Last 24 Hrs  T(C): 36.9 (11 Oct 2019 13:00), Max: 37 (10 Oct 2019 22:00)  T(F): 98.5 (11 Oct 2019 13:00), Max: 98.6 (10 Oct 2019 22:00)  HR: 91 (11 Oct 2019 13:49) (69 - 91)  BP: 120/72 (11 Oct 2019 13:49) (114/71 - 132/88)  BP(mean): 86 (10 Oct 2019 20:00) (82 - 99)  RR: 18 (11 Oct 2019 13:00) (16 - 24)  SpO2: 99% (11 Oct 2019 13:00) (97% - 100%)    Gen: NAD  Breast: Soft, nontender, not engorged.  Abdomen: Soft, nontender, no distension , firm uterine fundus at umbilicus.  Incision: C/D/I.  Pelvic: Normal lochia noted  Ext: No calf tenderness; +Improving Left Inner thigh carbuncle. Pt on Keflex 500mg BID--will add Neosporin topically TID as well    LABS:                        9.6    10.31 )-----------( 387      ( 10 Oct 2019 06:23 )             31.2       Rubella status:     Allergies    No Known Drug Allergies  Seafood (Hives; Swelling)  shellfish (Hives; Swelling)    Intolerances      MEDICATIONS  (STANDING):  acetaminophen   Tablet .. 975 milliGRAM(s) Oral <User Schedule>  cephalexin 500 milliGRAM(s) Oral every 12 hours  diphtheria/tetanus/pertussis (acellular) Vaccine (ADAcel) 0.5 milliLiter(s) IntraMuscular once  docusate sodium 100 milliGRAM(s) Oral three times a day  ferrous    sulfate 325 milliGRAM(s) Oral two times a day  heparin  Injectable 5000 Unit(s) SubCutaneous every 12 hours  ibuprofen  Tablet. 600 milliGRAM(s) Oral every 6 hours  influenza   Vaccine 0.5 milliLiter(s) IntraMuscular once  labetalol 200 milliGRAM(s) Oral three times a day  neomycin/BACItracin/polymyxin Topical Ointment 1 Application(s) Topical three times a day  NIFEdipine XL 30 milliGRAM(s) Oral daily    MEDICATIONS  (PRN):  diphenhydrAMINE 25 milliGRAM(s) Oral every 6 hours PRN Itching  docusate sodium 100 milliGRAM(s) Oral two times a day PRN Stool softening  glycerin Suppository - Adult 1 Suppository(s) Rectal at bedtime PRN Constipation  lanolin Ointment 1 Application(s) Topical every 6 hours PRN Sore Nipples  magnesium hydroxide Suspension 30 milliLiter(s) Oral two times a day PRN Constipation  oxyCODONE    IR 5 milliGRAM(s) Oral every 3 hours PRN Moderate to Severe Pain (4-10)  oxyCODONE    IR 5 milliGRAM(s) Oral once PRN Moderate to Severe Pain (4-10)  simethicone 80 milliGRAM(s) Chew every 4 hours PRN Gas        Assessment and Plan  POD #1 s/p C/S.  Doing well.  Encourage ambulation.  Incisional care and PO instructions reviewed.  CPC.

## 2019-10-11 NOTE — PROGRESS NOTE ADULT - SUBJECTIVE AND OBJECTIVE BOX
Postop Day  1  s/p   C- Section    THERAPY:  [ X] Spinal morphine         Sedation Score:	  [ X] Alert	    [  ] Drowsy        [  ] Arousable	[  ] Asleep	[  ] Unresponsive    Side Effects:	  [ X] None	     [  ] Nausea        [  ] Pruritus        [  ] Weakness   [  ] Numbness        ASSESSMENT/ PLAN   Change to po prn pain meds 24 hours post Spinal Morphine.  [ x ]Documentation and Verification of current medications

## 2019-10-11 NOTE — PROGRESS NOTE ADULT - SUBJECTIVE AND OBJECTIVE BOX
OB Progress Note:  Delivery, POD#1    S: 27yo POD#1 s/p LTCS @34weeks 2/2 breech presentation and sPEC. Her pain is well controlled. She is tolerating a regular diet and passing flatus. Denies N/V. Denies CP/SOB/lightheadedness/dizziness.   She is ambulating without difficulty.   Patient additionally was started on Keflex last night due to a previously noted folliculitis/cellulitis on her thigh.    O:   Vital Signs Last 24 Hrs  T(C): 36.7 (11 Oct 2019 06:15), Max: 37.2 (10 Oct 2019 09:29)  T(F): 98 (11 Oct 2019 06:15), Max: 98.96 (10 Oct 2019 09:29)  HR: 79 (11 Oct 2019 06:15) (69 - 89)  BP: 118/64 (11 Oct 2019 06:15) (114/71 - 137/93)  BP(mean): 86 (10 Oct 2019 20:00) (82 - 99)  RR: 18 (11 Oct 2019 06:15) (16 - 24)  SpO2: 99% (11 Oct 2019 02:15) (97% - 100%)    Labs:  Blood type: AB Positive  Rubella IgG: RPR: Negative                          9.6<L>   10.31 >-----------< 387    ( 10-10 @ 06:23 )             31.2<L>    10-10-19 @ 06:23      136  |  106  |  7   ----------------------------<  83  3.8   |  19<L>  |  0.48<L>        Ca    9.3      10 Oct 2019 06:23  Mg     6.5<H>     10-11  Mg     5.8<H>     10-10    TPro  6.8  /  Alb  3.2<L>  /  TBili  < 0.2<L>  /  DBili  x   /  AST  17  /  ALT  16  /  AlkPhos  186<H>  10-10-19 @ 06:23          PE:  General: NAD  Abdomen: Mildly distended, appropriately tender, incision c/d/i.  Extremities: No erythema, no pitting edema        Behzad Oquendo PGY1

## 2019-10-12 RX ADMIN — Medication 975 MILLIGRAM(S): at 10:22

## 2019-10-12 RX ADMIN — Medication 500 MILLIGRAM(S): at 18:53

## 2019-10-12 RX ADMIN — Medication 600 MILLIGRAM(S): at 13:30

## 2019-10-12 RX ADMIN — BACITRACIN ZINC NEOMYCIN SULFATE POLYMYXIN B SULFATE 1 APPLICATION(S): 400; 3.5; 5 OINTMENT TOPICAL at 14:20

## 2019-10-12 RX ADMIN — SIMETHICONE 80 MILLIGRAM(S): 80 TABLET, CHEWABLE ORAL at 05:13

## 2019-10-12 RX ADMIN — BACITRACIN ZINC NEOMYCIN SULFATE POLYMYXIN B SULFATE 1 APPLICATION(S): 400; 3.5; 5 OINTMENT TOPICAL at 05:45

## 2019-10-12 RX ADMIN — Medication 500 MILLIGRAM(S): at 05:19

## 2019-10-12 RX ADMIN — BACITRACIN ZINC NEOMYCIN SULFATE POLYMYXIN B SULFATE 1 APPLICATION(S): 400; 3.5; 5 OINTMENT TOPICAL at 22:22

## 2019-10-12 RX ADMIN — Medication 975 MILLIGRAM(S): at 22:50

## 2019-10-12 RX ADMIN — Medication 975 MILLIGRAM(S): at 22:20

## 2019-10-12 RX ADMIN — Medication 600 MILLIGRAM(S): at 05:19

## 2019-10-12 RX ADMIN — Medication 975 MILLIGRAM(S): at 11:00

## 2019-10-12 RX ADMIN — Medication 975 MILLIGRAM(S): at 02:00

## 2019-10-12 RX ADMIN — Medication 600 MILLIGRAM(S): at 00:05

## 2019-10-12 RX ADMIN — Medication 975 MILLIGRAM(S): at 01:23

## 2019-10-12 RX ADMIN — Medication 600 MILLIGRAM(S): at 14:00

## 2019-10-12 NOTE — PROGRESS NOTE ADULT - SUBJECTIVE AND OBJECTIVE BOX
OB Progress Note: LTCS, POD#2    S: 29yo POD#2 s/p LTCS @34wks 2/2 breech presentation c/b sPEC.  Patient is s/p Mg and is feeling well today. Pain is well controlled. She is tolerating a regular diet and passing flatus. She is voiding spontaneously, and ambulating without difficulty. Denies CP/SOB. Denies lightheadedness/dizziness. Denies N/V.  Patient continuing on Keflex for previously noted cellulitis/folliculitis of thigh.    O:  Vitals:  Vital Signs Last 24 Hrs  T(C): 36.8 (12 Oct 2019 05:05), Max: 37.2 (11 Oct 2019 21:40)  T(F): 98.3 (12 Oct 2019 05:05), Max: 98.9 (11 Oct 2019 21:40)  HR: 81 (12 Oct 2019 05:05) (75 - 91)  BP: 125/68 (12 Oct 2019 05:05) (111/65 - 147/79)  BP(mean): --  RR: 15 (12 Oct 2019 05:05) (15 - 18)  SpO2: 99% (12 Oct 2019 05:05) (98% - 100%)    MEDICATIONS  (STANDING):  acetaminophen   Tablet .. 975 milliGRAM(s) Oral <User Schedule>  cephalexin 500 milliGRAM(s) Oral every 12 hours  diphtheria/tetanus/pertussis (acellular) Vaccine (ADAcel) 0.5 milliLiter(s) IntraMuscular once  docusate sodium 100 milliGRAM(s) Oral three times a day  ferrous    sulfate 325 milliGRAM(s) Oral two times a day  heparin  Injectable 5000 Unit(s) SubCutaneous every 12 hours  ibuprofen  Tablet. 600 milliGRAM(s) Oral every 6 hours  influenza   Vaccine 0.5 milliLiter(s) IntraMuscular once  labetalol 200 milliGRAM(s) Oral three times a day  neomycin/BACItracin/polymyxin Topical Ointment 1 Application(s) Topical three times a day  NIFEdipine XL 30 milliGRAM(s) Oral daily      MEDICATIONS  (PRN):  diphenhydrAMINE 25 milliGRAM(s) Oral every 6 hours PRN Itching  docusate sodium 100 milliGRAM(s) Oral two times a day PRN Stool softening  glycerin Suppository - Adult 1 Suppository(s) Rectal at bedtime PRN Constipation  lanolin Ointment 1 Application(s) Topical every 6 hours PRN Sore Nipples  magnesium hydroxide Suspension 30 milliLiter(s) Oral two times a day PRN Constipation  oxyCODONE    IR 5 milliGRAM(s) Oral every 3 hours PRN Moderate to Severe Pain (4-10)  oxyCODONE    IR 5 milliGRAM(s) Oral once PRN Moderate to Severe Pain (4-10)  simethicone 80 milliGRAM(s) Chew every 4 hours PRN Gas      Labs:  Blood type: AB Positive  Rubella IgG: RPR: Negative                          9.6<L>   10.31 >-----------< 387    ( 10-10 @ 06:23 )             31.2<L>    10-10-19 @ 06:23      136  |  106  |  7   ----------------------------<  83  3.8   |  19<L>  |  0.48<L>        Ca    9.3      10 Oct 2019 06:23  Mg     6.5<H>     10-11  Mg     6.5<H>     10-11  Mg     5.8<H>     10-10    TPro  6.8  /  Alb  3.2<L>  /  TBili  < 0.2<L>  /  DBili  x   /  AST  17  /  ALT  16  /  AlkPhos  186<H>  10-10-19 @ 06:23          PE:  General: NAD  Abdomen: Soft, appropriately tender, incision c/d/i.  Extremities: No erythema, no pitting edema      Behzad Oquendo PGY1

## 2019-10-12 NOTE — PROGRESS NOTE ADULT - SUBJECTIVE AND OBJECTIVE BOX
INTERVAL HPI/OVERNIGHT EVENTS: Pt seen and examined at bedside.  Adequate PO pain control. +flatus. +voiding without difficulty, +ambulation, sergio reg diet.    MEDICATIONS  (STANDING):  acetaminophen   Tablet .. 975 milliGRAM(s) Oral <User Schedule>  cephalexin 500 milliGRAM(s) Oral every 12 hours  diphtheria/tetanus/pertussis (acellular) Vaccine (ADAcel) 0.5 milliLiter(s) IntraMuscular once  docusate sodium 100 milliGRAM(s) Oral three times a day  ferrous    sulfate 325 milliGRAM(s) Oral two times a day  heparin  Injectable 5000 Unit(s) SubCutaneous every 12 hours  ibuprofen  Tablet. 600 milliGRAM(s) Oral every 6 hours  influenza   Vaccine 0.5 milliLiter(s) IntraMuscular once  labetalol 200 milliGRAM(s) Oral three times a day  neomycin/BACItracin/polymyxin Topical Ointment 1 Application(s) Topical three times a day  NIFEdipine XL 30 milliGRAM(s) Oral daily    MEDICATIONS  (PRN):  diphenhydrAMINE 25 milliGRAM(s) Oral every 6 hours PRN Itching  docusate sodium 100 milliGRAM(s) Oral two times a day PRN Stool softening  glycerin Suppository - Adult 1 Suppository(s) Rectal at bedtime PRN Constipation  lanolin Ointment 1 Application(s) Topical every 6 hours PRN Sore Nipples  magnesium hydroxide Suspension 30 milliLiter(s) Oral two times a day PRN Constipation  oxyCODONE    IR 5 milliGRAM(s) Oral every 3 hours PRN Moderate to Severe Pain (4-10)  oxyCODONE    IR 5 milliGRAM(s) Oral once PRN Moderate to Severe Pain (4-10)  simethicone 80 milliGRAM(s) Chew every 4 hours PRN Gas      Vital Signs Last 24 Hrs  T(C): 36.8 (12 Oct 2019 05:05), Max: 37.2 (11 Oct 2019 21:40)  T(F): 98.3 (12 Oct 2019 05:05), Max: 98.9 (11 Oct 2019 21:40)  HR: 81 (12 Oct 2019 05:05) (75 - 91)  BP: 125/68 (12 Oct 2019 05:05) (111/65 - 147/79)  BP(mean): --  RR: 15 (12 Oct 2019 05:05) (15 - 18)  SpO2: 99% (12 Oct 2019 05:05) (98% - 100%)    PHYSICAL EXAM:    GA: NAD, A+0 x 3  Abd: ( + ) BS, soft, nontender, nondistended, no rebound or guarding,   Uterus: Fundus midline; firm  Incision: C/D/I    LABS:      Mg     6.5     10-11

## 2019-10-13 VITALS
RESPIRATION RATE: 18 BRPM | OXYGEN SATURATION: 100 % | SYSTOLIC BLOOD PRESSURE: 135 MMHG | DIASTOLIC BLOOD PRESSURE: 88 MMHG | TEMPERATURE: 98 F | HEART RATE: 86 BPM

## 2019-10-13 RX ORDER — NIFEDIPINE 30 MG
1 TABLET, EXTENDED RELEASE 24 HR ORAL
Qty: 30 | Refills: 0
Start: 2019-10-13

## 2019-10-13 RX ORDER — CEPHALEXIN 500 MG
1 CAPSULE ORAL
Qty: 20 | Refills: 0
Start: 2019-10-13 | End: 2019-10-22

## 2019-10-13 RX ORDER — LABETALOL HCL 100 MG
1 TABLET ORAL
Qty: 0 | Refills: 0 | DISCHARGE
Start: 2019-10-13

## 2019-10-13 RX ADMIN — Medication 600 MILLIGRAM(S): at 12:36

## 2019-10-13 RX ADMIN — Medication 600 MILLIGRAM(S): at 06:23

## 2019-10-13 RX ADMIN — Medication 600 MILLIGRAM(S): at 13:30

## 2019-10-13 RX ADMIN — BACITRACIN ZINC NEOMYCIN SULFATE POLYMYXIN B SULFATE 1 APPLICATION(S): 400; 3.5; 5 OINTMENT TOPICAL at 14:15

## 2019-10-13 RX ADMIN — BACITRACIN ZINC NEOMYCIN SULFATE POLYMYXIN B SULFATE 1 APPLICATION(S): 400; 3.5; 5 OINTMENT TOPICAL at 06:24

## 2019-10-13 RX ADMIN — Medication 975 MILLIGRAM(S): at 08:51

## 2019-10-13 RX ADMIN — Medication 975 MILLIGRAM(S): at 15:00

## 2019-10-13 RX ADMIN — SIMETHICONE 80 MILLIGRAM(S): 80 TABLET, CHEWABLE ORAL at 12:36

## 2019-10-13 RX ADMIN — Medication 975 MILLIGRAM(S): at 14:15

## 2019-10-13 RX ADMIN — Medication 600 MILLIGRAM(S): at 07:17

## 2019-10-13 RX ADMIN — SIMETHICONE 80 MILLIGRAM(S): 80 TABLET, CHEWABLE ORAL at 08:52

## 2019-10-13 RX ADMIN — Medication 500 MILLIGRAM(S): at 06:24

## 2019-10-13 RX ADMIN — Medication 975 MILLIGRAM(S): at 09:45

## 2019-10-13 NOTE — PROGRESS NOTE ADULT - SUBJECTIVE AND OBJECTIVE BOX
OB Progress Note: LTCS, POD#3    S: 27yo POD#3 s/p pLTCS at 34 weeks for sPEC and breech presentation s/p magnesium seizure prophylaxis therapy. Pain is well controlled. She is tolerating a regular diet and passing flatus. She is voiding spontaneously, and ambulating without difficulty. Denies CP/SOB. Denies lightheadedness/dizziness. Denies N/V.    O:  Vitals:  Vital Signs Last 24 Hrs  T(C): 36.7 (13 Oct 2019 06:20), Max: 37.1 (13 Oct 2019 02:00)  T(F): 98.1 (13 Oct 2019 06:20), Max: 98.8 (13 Oct 2019 02:00)  HR: 76 (13 Oct 2019 06:20) (76 - 88)  BP: 133/84 (13 Oct 2019 06:20) (132/81 - 136/89)  BP(mean): --  RR: 18 (13 Oct 2019 06:20) (16 - 19)  SpO2: 98% (13 Oct 2019 06:20) (98% - 100%)    MEDICATIONS  (STANDING):  acetaminophen   Tablet .. 975 milliGRAM(s) Oral <User Schedule>  cephalexin 500 milliGRAM(s) Oral every 12 hours  diphtheria/tetanus/pertussis (acellular) Vaccine (ADAcel) 0.5 milliLiter(s) IntraMuscular once  docusate sodium 100 milliGRAM(s) Oral three times a day  ferrous    sulfate 325 milliGRAM(s) Oral two times a day  heparin  Injectable 5000 Unit(s) SubCutaneous every 12 hours  ibuprofen  Tablet. 600 milliGRAM(s) Oral every 6 hours  influenza   Vaccine 0.5 milliLiter(s) IntraMuscular once  labetalol 200 milliGRAM(s) Oral three times a day  neomycin/BACItracin/polymyxin Topical Ointment 1 Application(s) Topical three times a day  NIFEdipine XL 30 milliGRAM(s) Oral daily      MEDICATIONS  (PRN):  diphenhydrAMINE 25 milliGRAM(s) Oral every 6 hours PRN Itching  docusate sodium 100 milliGRAM(s) Oral two times a day PRN Stool softening  glycerin Suppository - Adult 1 Suppository(s) Rectal at bedtime PRN Constipation  lanolin Ointment 1 Application(s) Topical every 6 hours PRN Sore Nipples  magnesium hydroxide Suspension 30 milliLiter(s) Oral two times a day PRN Constipation  oxyCODONE    IR 5 milliGRAM(s) Oral every 3 hours PRN Moderate to Severe Pain (4-10)  oxyCODONE    IR 5 milliGRAM(s) Oral once PRN Moderate to Severe Pain (4-10)  simethicone 80 milliGRAM(s) Chew every 4 hours PRN Gas      Labs:  Blood type: AB Positive  Rubella IgG: RPR: Negative          Mg     6.5<H>     10-11  Mg     6.5<H>     10-11  Mg     5.8<H>     10-10    PE:  General: NAD  Abdomen: Soft, appropriately tender, incision c/d/i.  Extremities: No erythema, no pitting edema

## 2019-10-13 NOTE — PROGRESS NOTE ADULT - PROBLEM SELECTOR PLAN 1
- Continue regular diet.  - Increase ambulation.  - Continue motrin, tylenol, oxycodone PRN for pain control.
Neuro: pt without pain, status post magnesium  CV: Hemodynamically stable, continue with labetalol 200 TID and procardia 30XL  Pulm: Saturating well on room air  GI: regular diet  : voiding spontaneously  Heme: c/w SCDs for DVT ppx  Fetal Well-being: NST BID, PNV, delivery at 34 weeks, currently breech presentation, will need to schedule pLTCS at 34weeks  Dispo: Continue routine inpatient care    Mathew Cordero MD PGY3
- Continue regular diet.  - Increase ambulation.  - Continue motrin, tylenol, oxycodone PRN for pain control.   - F/u AM CBC
- Continue regular diet.  - Increase ambulation.  - Continue motrin, tylenol, oxycodone PRN for pain control.   - Labetalol 200 TID, Procardia 30XL for blood pressure control, continue to monitor pressures  - Keflex for thigh cellulitis  - discharge planning    Janet Mena, PGY-1
1. Maternal well being  - CV: hemodynamically stable, continue labetalol 200mg TID, procardia 30mg XL  - Resp: saturating well on RA  - GI: reg diet  - Heme: HSQ for DVT ppx  - ID: afebrile  - Neuro: s/p Mg for seizure prophylaxis. Will restart at time of delivery     2. Fetal well being  - NST BID  - BPP Mondays, Thursdays  - prenatal vitamin    C Chito pgy3
1. Maternal well being  - CV: hemodynamically stable, continue labetalol 200mg TID, procardia 30mg XL.   - Resp: saturating well on RA  - GI: reg diet  - Heme: HSQ for DVT ppx  - ID: afebrile, will start keflex 500 BID after  section for left groin abscess  - Neuro: s/p Mg for seizure prophylaxis. Will restart at time of delivery     2. Fetal well being  -  section today  - prenatal vitamin    Mathew Cordero MD PGY3
1. Maternal well being  - CV: hemodynamically stable, continue labetalol 200mg TID, procardia 30mg XL. BPs over shift 130-140/80  - Resp: saturating well on RA  - GI: reg diet  - Heme: HSQ for DVT ppx  - ID: afebrile  - Neuro: s/p Mg for seizure prophylaxis. Will restart at time of delivery     2. Fetal well being  - NST BID  - BPP ,   - prenatal vitamin  - for scheduled  on 10/10    Mathew Cordero MD PGY3
1. Maternal well being  - CV: hemodynamically stable, continue labetalol 200mg TID, procardia 30mg XL. BPs over shift 130-140/80-90  - Resp: saturating well on RA  - GI: reg diet  - Heme: HSQ for DVT ppx  - ID: afebrile  - Neuro: s/p Mg for seizure prophylaxis. Will restart at time of delivery     2. Fetal well being  - NST BID  - BPP Mondays, Thursdays  - prenatal vitamin    TLal PGY3
Neuro: pt without pain, status post magnesium  CV: Hemodynamically stable, continue with labetalol 200 TID and procardia 30XL  Pulm: Saturating well on room air  GI: regular diet  : voiding spontaneously  Heme: c/w SCDs for DVT ppx  Fetal Well-being: NST BID, PNV, delivery at 34 weeks, currently breech presentation, will need to schedule pLTCS at 34weeks  Dispo: Continue routine inpatient care    Mathew Cordero MD PGY3
Neuro: pt without pain, status post magnesium  CV: Hemodynamically stable, continue with labetalol 200 TID and procardia 30XL  Pulm: Saturating well on room air  GI: regular diet  : voiding spontaneously  Heme: c/w SCDs for DVT ppx  Fetal Well-being: NST BID, PNV, delivery at 34 weeks, currently breech presentation, will need to schedule pLTCS at 34weeks or consider version  Dispo: Continue routine inpatient care    Leti Umaña MD PGY3
Neuro: pt without pain, status post magnesium  CV: Hemodynamically stable, continue with labetalol 200 TID and procardia 30XL  Pulm: Saturating well on room air  GI: regular diet  : voiding spontaneously  Heme: c/w SCDs for DVT ppx  Fetal Well-being: NST BID, PNV, delivery at 34 weeks, currently breech presentation, will need to schedule pLTCS at 34weeks or consider version, ATU sono today  Dispo: Continue routine inpatient care    Leti Umaña MD PGY3

## 2019-10-13 NOTE — PROGRESS NOTE ADULT - PROBLEM SELECTOR PROBLEM 1
Preeclampsia, severe, third trimester
 delivery delivered
 delivery delivered
Preeclampsia, severe, third trimester
 delivery delivered

## 2019-10-14 ENCOUNTER — APPOINTMENT (OUTPATIENT)
Dept: ANTEPARTUM | Facility: HOSPITAL | Age: 29
End: 2019-10-14

## 2019-10-14 ENCOUNTER — APPOINTMENT (OUTPATIENT)
Dept: ANTEPARTUM | Facility: CLINIC | Age: 29
End: 2019-10-14

## 2019-10-17 ENCOUNTER — APPOINTMENT (OUTPATIENT)
Dept: ANTEPARTUM | Facility: HOSPITAL | Age: 29
End: 2019-10-17

## 2019-10-17 ENCOUNTER — APPOINTMENT (OUTPATIENT)
Dept: ANTEPARTUM | Facility: CLINIC | Age: 29
End: 2019-10-17

## 2019-10-19 LAB — SURGICAL PATHOLOGY STUDY: SIGNIFICANT CHANGE UP

## 2019-10-21 ENCOUNTER — APPOINTMENT (OUTPATIENT)
Dept: ANTEPARTUM | Facility: CLINIC | Age: 29
End: 2019-10-21

## 2019-10-21 ENCOUNTER — APPOINTMENT (OUTPATIENT)
Dept: ANTEPARTUM | Facility: HOSPITAL | Age: 29
End: 2019-10-21

## 2019-10-23 LAB — SURGICAL PATHOLOGY STUDY: SIGNIFICANT CHANGE UP

## 2019-10-24 ENCOUNTER — APPOINTMENT (OUTPATIENT)
Dept: ANTEPARTUM | Facility: HOSPITAL | Age: 29
End: 2019-10-24

## 2019-10-25 DIAGNOSIS — O13.3 GESTATIONAL [PREGNANCY-INDUCED] HYPERTENSION WITHOUT SIGNIFICANT PROTEINURIA, THIRD TRIMESTER: ICD-10-CM

## 2019-10-25 DIAGNOSIS — O41.93X0 DISORDER OF AMNIOTIC FLUID AND MEMBRANES, UNSPECIFIED, THIRD TRIMESTER, NOT APPLICABLE OR UNSPECIFIED: ICD-10-CM

## 2019-10-25 DIAGNOSIS — O34.93 MATERNAL CARE FOR ABNORMALITY OF PELVIC ORGAN, UNSPECIFIED, THIRD TRIMESTER: ICD-10-CM

## 2019-10-28 ENCOUNTER — APPOINTMENT (OUTPATIENT)
Dept: ANTEPARTUM | Facility: CLINIC | Age: 29
End: 2019-10-28

## 2019-10-28 ENCOUNTER — APPOINTMENT (OUTPATIENT)
Dept: ANTEPARTUM | Facility: HOSPITAL | Age: 29
End: 2019-10-28

## 2020-12-08 NOTE — PROGRESS NOTE ADULT - ASSESSMENT
Jenny from [facility] reporting pt's sinus tachycardic rate of 154.    [phone]  
Spoke to Jenny at [facility] who informed that, pt had an episode of ST at 154 bpm on 12/04 at 2316 EST. Tracings will be faxed to the office.   
Spoke with pt. She doesn't remember any symptoms at the time of day of the ST  
Tracings shown to BG  
pod 2 s/p pc/s recovering well   [] continue routine postop/postpartum care  [] encourage ambulation   [] continue DVT ppx   [] continue regular diet   [] continue pain management PRN   [] discharge planning/anticipate d/c on POD3
27 yo  33w5d a/w severe preeclampsia. Clinical condition improving with BPs stabilized.
27 yo  33w6d a/w severe preeclampsia. Clinical condition unchanged with BPs stabilized.
27 yo  34.1w a/w severe preeclampsia. Clinical condition improving with BPs stabilized with new left groin skin abscess.
27 yo  34w a/w severe preeclampsia. Clinical condition improving with BPs stabilized.
29 yo  33w3d a/w severe preeclampsia. Clinical condition improving with BPs stabilized.
29 yo  33w4d a/w severe preeclampsia. Clinical condition improving with BPs stabilized.
A/P: 27yo  POD#2 s/p pLTCS w/ sPEC s/p magnesium seizure prophylaxis therapy.  Patient is stable and doing well post-operatively.
A/P: 29yo POD#1 s/p LTCS c/b sPEC.  Patient is stable and doing well post-operatively.
Pt is a 29 yo  at 32.6 weeks GA who presents with sPEC, with stable BPs.
28yoF  at 33.1wk GA a/w with severe preeclampsia. One severe range BP overnight. Pt currently stable and asymptomatic.
28yoF  at 33.2wk GA a/w with severe preeclampsia. One severe range BP overnight. Pt currently stable and asymptomatic.
A/P: 27yo POD#2 s/p LTCS c/b sPEC.  Patient is stable and doing well post-operatively.

## 2022-01-22 NOTE — OB NEONATOLOGY/PEDIATRICIAN DELIVERY SUMMARY - NS_[facility]_OBGYN_ALL_OB
appropriate  Outcome: Progressing  Goal: Maintains/Returns to pre admission functional level  Description: INTERVENTIONS:  - Perform BMAT or MOVE assessment daily    - Set and communicate daily mobility goal to care team and patient/family/caregiver  - Collaborate with rehabilitation services on mobility goals if consulted  - Perform Range of Motion  times a day  - Reposition patient every hours    - Dangle patient  times a day  - Stand patient  times a day  - Ambulate patient  times a day  - Out of bed to chair  times a day   - Out of bed for meals  times a day  - Out of bed for toileting  - Record patient progress and toleration of activity level   Outcome: Progressing     Problem: DISCHARGE PLANNING  Goal: Discharge to home or other facility with appropriate resources  Description: INTERVENTIONS:  - Identify barriers to discharge w/patient and caregiver  - Arrange for needed discharge resources and transportation as appropriate  - Identify discharge learning needs (meds, wound care, etc )  - Arrange for interpretive services to assist at discharge as needed  - Refer to Case Management Department for coordinating discharge planning if the patient needs post-hospital services based on physician/advanced practitioner order or complex needs related to functional status, cognitive ability, or social support system  Outcome: Progressing     Problem: Knowledge Deficit  Goal: Patient/family/caregiver demonstrates understanding of disease process, treatment plan, medications, and discharge instructions  Description: Complete learning assessment and assess knowledge base  Interventions:  - Provide teaching at level of understanding  - Provide teaching via preferred learning methods  Outcome: Progressing     Problem: Nutrition/Hydration-ADULT  Goal: Nutrient/Hydration intake appropriate for improving, restoring or maintaining nutritional needs  Description: Monitor and assess patient's nutrition/hydration status for malnutrition  Collaborate with interdisciplinary team and initiate plan and interventions as ordered  Monitor patient's weight and dietary intake as ordered or per policy  Utilize nutrition screening tool and intervene as necessary  Determine patient's food preferences and provide high-protein, high-caloric foods as appropriate       INTERVENTIONS:  - Monitor oral intake, urinary output, labs, and treatment plans  - Assess nutrition and hydration status and recommend course of action  - Evaluate amount of meals eaten  - Assist patient with eating if necessary   - Allow adequate time for meals  - Recommend/ encourage appropriate diets, oral nutritional supplements, and vitamin/mineral supplements  - Order, calculate, and assess calorie counts as needed  - Recommend, monitor, and adjust tube feedings and TPN/PPN based on assessed needs  - Assess need for intravenous fluids  - Provide specific nutrition/hydration education as appropriate  - Include patient/family/caregiver in decisions related to nutrition  Outcome: Progressing None

## 2022-05-10 NOTE — OB RN PREOPERATIVE CHECKLIST - HOW ADMINISTERED
See MAR for last dose taken
presents C/O itching and swelling to both eyes forehead and right cheek. as per patient cold compresses have helped the swelling. No Pmhx.

## 2023-06-08 NOTE — PROVIDER CONTACT NOTE (OTHER) - BACKGROUND
32.6wks Preeclamptic with severe features, magnesium sulfate discontinued at 06:30am 10/1/19
Patient admitted with PEC with severe features. S/p Mag and Beta from 10/1.
32.5wks on magnesium sulfate for elevated bp's and visual disturbances reaching betamethasone
06/07/23

## 2024-10-29 NOTE — OB RN PATIENT PROFILE - NS PRO DEPRESSION SCREENING Y/N1
yes Total Units: 0 Expiration Date (Month Year): 05/2026 Map Statement: Please see attached map for locations and injection amounts. Detail Level: Detailed Lot #: [id number] Post-Care Instructions: Patient instructed to not lie down for 4 hours and limit physical activity for 24 hours. Show Inventory Tab: Hide Consent: Written consent obtained. Risks include but not limited to lid/brow ptosis, bruising, swelling, diplopia, temporary effect, incomplete chemical denervation.

## 2025-03-28 NOTE — PROGRESS NOTE ADULT - SUBJECTIVE AND OBJECTIVE BOX
R3 Note  HD#10    INTERVAL HPI/OVERNIGHT EVENTS: Pt seen and examined at bedside.  Pt without complaints. Denies headache, blurry vision and spots in her vision.  Ambulating, passing flatus, tolerating regular diet, pain controlled with analgesia, urinating spontaneously  She denies nausea/vomiting/fever/chills/chest pain/SOB/dizziness.    MEDICATIONS  (STANDING):  [place] 1 Tablet(s) 1 Tablet(s) Oral two times a day  docusate sodium 100 milliGRAM(s) Oral three times a day  ferrous    sulfate 325 milliGRAM(s) Oral two times a day  heparin  Injectable 5000 Unit(s) SubCutaneous every 12 hours  influenza   Vaccine 0.5 milliLiter(s) IntraMuscular once  labetalol 200 milliGRAM(s) Oral three times a day  NIFEdipine XL 30 milliGRAM(s) Oral daily  prenatal multivitamin 1 Tablet(s) Oral daily    MEDICATIONS  (PRN):  famotidine    Tablet 20 milliGRAM(s) Oral two times a day PRN indigestion      12 point ROS negative except as outlined above    Vital Signs Last 24 Hrs  T(C): 36.7 (09 Oct 2019 05:07), Max: 37.2 (08 Oct 2019 09:45)  T(F): 98.1 (09 Oct 2019 05:07), Max: 99 (08 Oct 2019 09:45)  HR: 74 (09 Oct 2019 05:07) (74 - 90)  BP: 124/69 (09 Oct 2019 05:07) (124/69 - 135/89)  BP(mean): --  RR: 18 (09 Oct 2019 05:07) (16 - 18)  SpO2: 99% (09 Oct 2019 05:07) (95% - 99%)    I&O's Summary        PHYSICAL EXAM:    GA: NAD, A+0 x 3  CV: RRR  Pulm: CTA BL  Abd: soft, nontender, nondistended, no rebound or guarding,   Extremities: no swelling or calf tenderness  Lines:      LABS:                          10.1   11.86 )-----------( 408      ( 07 Oct 2019 08:07 )             32.7   baso 0.5    eos 1.9    imm gran 2.2    lymph 12.3   mono 5.1    poly 78.0         PT/INR - ( 07 Oct 2019 08:07 )   PT: 11.2 SEC;   INR: 1.01          PTT - ( 07 Oct 2019 08:07 )  PTT:28.3 SEC          RADIOLOGY & ADDITIONAL TESTS: 29-Mar-2025 00:18

## 2025-05-16 NOTE — OB NEONATOLOGY/PEDIATRICIAN DELIVERY SUMMARY - NSHXCHECK_OBGYN_ALL_OB
Problem: HEMATOLOGIC - ADULT  Goal: Maintains hematologic stability  Description: INTERVENTIONS  - Assess for signs and symptoms of bleeding or hemorrhage  - Monitor labs  - Administer supportive blood products/factors as ordered and appropriate  5/16/2025 0205 by Daisha Matson RN  Outcome: Progressing  5/16/2025 0132 by Daisha Matson RN  Outcome: Progressing     Problem: CARDIOVASCULAR - ADULT  Goal: Maintains optimal cardiac output and hemodynamic stability  Description: INTERVENTIONS:  - Monitor I/O, vital signs and rhythm  - Monitor for S/S and trends of decreased cardiac output  - Administer and titrate ordered vasoactive medications to optimize hemodynamic stability  - Assess quality of pulses, skin color and temperature  - Assess for signs of decreased coronary artery perfusion  - Instruct patient to report change in severity of symptoms  5/16/2025 0205 by Daisha Matson RN  Outcome: Progressing  5/16/2025 0132 by Daisha Matson RN  Outcome: Progressing  Goal: Absence of cardiac dysrhythmias or at baseline rhythm  Description: INTERVENTIONS:  - Continuous cardiac monitoring, vital signs, obtain 12 lead EKG if ordered  - Administer antiarrhythmic and heart rate control medications as ordered  - Monitor electrolytes and administer replacement therapy as ordered  5/16/2025 0205 by Daisha Matson RN  Outcome: Progressing  5/16/2025 0132 by Daisha Matson RN  Outcome: Progressing      Yes